# Patient Record
Sex: FEMALE | Race: WHITE | NOT HISPANIC OR LATINO
[De-identification: names, ages, dates, MRNs, and addresses within clinical notes are randomized per-mention and may not be internally consistent; named-entity substitution may affect disease eponyms.]

---

## 2017-10-29 ENCOUNTER — RECORD ABSTRACTING (OUTPATIENT)
Age: 82
End: 2017-10-29

## 2017-10-29 DIAGNOSIS — Z80.3 FAMILY HISTORY OF MALIGNANT NEOPLASM OF BREAST: ICD-10-CM

## 2017-10-29 DIAGNOSIS — Z80.7 FAMILY HISTORY OF OTHER MALIGNANT NEOPLASMS OF LYMPHOID, HEMATOPOIETIC AND RELATED TISSUES: ICD-10-CM

## 2017-10-29 DIAGNOSIS — Z82.49 FAMILY HISTORY OF ISCHEMIC HEART DISEASE AND OTHER DISEASES OF THE CIRCULATORY SYSTEM: ICD-10-CM

## 2017-10-31 ENCOUNTER — APPOINTMENT (OUTPATIENT)
Dept: FAMILY MEDICINE | Facility: CLINIC | Age: 82
End: 2017-10-31
Payer: MEDICARE

## 2017-10-31 VITALS
HEIGHT: 64 IN | DIASTOLIC BLOOD PRESSURE: 60 MMHG | BODY MASS INDEX: 27.83 KG/M2 | HEART RATE: 82 BPM | SYSTOLIC BLOOD PRESSURE: 155 MMHG | WEIGHT: 163 LBS | OXYGEN SATURATION: 98 %

## 2017-10-31 PROCEDURE — 99214 OFFICE O/P EST MOD 30 MIN: CPT

## 2018-02-19 ENCOUNTER — RX RENEWAL (OUTPATIENT)
Age: 83
End: 2018-02-19

## 2018-02-20 ENCOUNTER — RX RENEWAL (OUTPATIENT)
Age: 83
End: 2018-02-20

## 2018-03-28 ENCOUNTER — MEDICATION RENEWAL (OUTPATIENT)
Age: 83
End: 2018-03-28

## 2018-07-19 ENCOUNTER — APPOINTMENT (OUTPATIENT)
Dept: FAMILY MEDICINE | Facility: CLINIC | Age: 83
End: 2018-07-19

## 2018-09-12 RX ORDER — VALSARTAN 80 MG/1
80 TABLET, COATED ORAL DAILY
Qty: 90 | Refills: 1 | Status: DISCONTINUED | COMMUNITY
End: 2018-09-12

## 2018-09-18 ENCOUNTER — APPOINTMENT (OUTPATIENT)
Dept: FAMILY MEDICINE | Facility: CLINIC | Age: 83
End: 2018-09-18
Payer: MEDICARE

## 2018-09-18 ENCOUNTER — NON-APPOINTMENT (OUTPATIENT)
Age: 83
End: 2018-09-18

## 2018-09-18 VITALS
BODY MASS INDEX: 27.83 KG/M2 | HEIGHT: 64 IN | SYSTOLIC BLOOD PRESSURE: 146 MMHG | DIASTOLIC BLOOD PRESSURE: 84 MMHG | HEART RATE: 96 BPM | WEIGHT: 163 LBS | RESPIRATION RATE: 14 BRPM | OXYGEN SATURATION: 97 %

## 2018-09-18 LAB — GLUCOSE BLDC GLUCOMTR-MCNC: 255

## 2018-09-18 PROCEDURE — 82962 GLUCOSE BLOOD TEST: CPT

## 2018-09-18 PROCEDURE — 93000 ELECTROCARDIOGRAM COMPLETE: CPT

## 2018-09-18 PROCEDURE — 36415 COLL VENOUS BLD VENIPUNCTURE: CPT

## 2018-09-18 PROCEDURE — 99214 OFFICE O/P EST MOD 30 MIN: CPT | Mod: 25

## 2018-09-18 RX ORDER — TRAMADOL HYDROCHLORIDE 50 MG/1
50 TABLET, COATED ORAL
Qty: 60 | Refills: 5 | Status: DISCONTINUED | COMMUNITY
Start: 2017-10-31 | End: 2018-09-18

## 2018-09-18 RX ORDER — OXYCODONE 5 MG/1
5 TABLET ORAL
Qty: 60 | Refills: 0 | Status: DISCONTINUED | COMMUNITY
Start: 2017-10-31 | End: 2018-09-18

## 2018-09-18 RX ORDER — CYCLOBENZAPRINE HYDROCHLORIDE 10 MG/1
10 TABLET, FILM COATED ORAL
Qty: 30 | Refills: 1 | Status: DISCONTINUED | COMMUNITY
Start: 2017-10-31 | End: 2018-09-18

## 2018-09-18 NOTE — ASSESSMENT
[FreeTextEntry1] : ekg nsr pac\par Labs drawn/ specimens obtained  in office on this date of service  for evaluation of   assessed conditions - diabetes, thyroid    to be run at [facility].\par will forward labs to Dr Merritt as per patient request\par Discussed with patient that she is feeling side effects from prednisone and chemotherapy and the elevated blood sugars. No sugar diet. She has had success in the past. Patient does not want medications. Take amlodipine. Try miralax otc for constipation. \par We spent sufficient time to discuss aspects of care; questions were answered  to patient's satisfaction.The diagnosis and care plan were discussed with patient in detail.  Patient test results were  reviewed and explained in full. All questions were answered.\par

## 2018-09-18 NOTE — HEALTH RISK ASSESSMENT
[] : No [No falls in past year] : Patient reported no falls in the past year [de-identified] : hematology Dr Merritt

## 2018-09-18 NOTE — HISTORY OF PRESENT ILLNESS
[FreeTextEntry1] : patient presents for glucose check as requested by oncologist- Dr. Merritt \par also, c/o constipation for x 1 week. 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 she just had chemotherapy\par She is feeling weak, fatigue, constipated. she is concerned with her sugars.\par I was on prednisone 50 mg 1 tab for  5 days for   chemotherapy. ( she has an allergy to corticosteroids)  I started to feel pounding in my heart.  I had a different type of chemo this time and it was not agreeing with me. \par \par  Valsartan was recalled and I did not replace it with anything due to the fact it was a very low dose and she has multiple allergies to medications\par \par She did not take her blood pressure medications today.

## 2018-09-18 NOTE — REVIEW OF SYSTEMS
[Fatigue] : fatigue [Recent Change In Weight] : ~T recent weight change [Constipation] : constipation [Negative] : Respiratory

## 2018-09-18 NOTE — PHYSICAL EXAM
[No Acute Distress] : no acute distress [Well Nourished] : well nourished [Normal Rhythm/Effort] : normal respiratory rhythm and effort [Clear Bilaterally] : the lungs were clear to auscultation bilaterally [Normal to Percussion] : the lungs were normal to percussion [Normal] : palpation of the chest was normal [Tachycardia] : tachycardic [Premature Beats] : regular with premature beats [Normal S1] : normal S1 [Normal S2] : normal S2

## 2018-09-21 ENCOUNTER — MEDICATION RENEWAL (OUTPATIENT)
Age: 83
End: 2018-09-21

## 2018-09-21 LAB
ALBUMIN SERPL ELPH-MCNC: 4.3 G/DL
ALP BLD-CCNC: 59 U/L
ALT SERPL-CCNC: 24 U/L
ANION GAP SERPL CALC-SCNC: 15 MMOL/L
AST SERPL-CCNC: 18 U/L
BILIRUB SERPL-MCNC: 0.8 MG/DL
BUN SERPL-MCNC: 21 MG/DL
CALCIUM SERPL-MCNC: 10.2 MG/DL
CHLORIDE SERPL-SCNC: 96 MMOL/L
CO2 SERPL-SCNC: 27 MMOL/L
CREAT SERPL-MCNC: 0.86 MG/DL
GLUCOSE SERPL-MCNC: 247 MG/DL
HBA1C MFR BLD HPLC: 8.6 %
POTASSIUM SERPL-SCNC: 3.8 MMOL/L
PROT SERPL-MCNC: 6.9 G/DL
SODIUM SERPL-SCNC: 138 MMOL/L
T3 SERPL-MCNC: 126 NG/DL
T4 FREE SERPL-MCNC: 1.4 NG/DL
TSH SERPL-ACNC: 2.76 UIU/ML

## 2018-10-30 ENCOUNTER — RX RENEWAL (OUTPATIENT)
Age: 83
End: 2018-10-30

## 2019-01-23 ENCOUNTER — MEDICATION RENEWAL (OUTPATIENT)
Age: 84
End: 2019-01-23

## 2019-02-19 ENCOUNTER — MEDICATION RENEWAL (OUTPATIENT)
Age: 84
End: 2019-02-19

## 2019-02-27 ENCOUNTER — NON-APPOINTMENT (OUTPATIENT)
Age: 84
End: 2019-02-27

## 2019-02-27 ENCOUNTER — APPOINTMENT (OUTPATIENT)
Dept: FAMILY MEDICINE | Facility: CLINIC | Age: 84
End: 2019-02-27
Payer: MEDICARE

## 2019-02-27 VITALS
HEART RATE: 84 BPM | OXYGEN SATURATION: 98 % | BODY MASS INDEX: 26.63 KG/M2 | WEIGHT: 156 LBS | HEIGHT: 64 IN | SYSTOLIC BLOOD PRESSURE: 144 MMHG | RESPIRATION RATE: 14 BRPM | DIASTOLIC BLOOD PRESSURE: 84 MMHG

## 2019-02-27 DIAGNOSIS — Z87.898 PERSONAL HISTORY OF OTHER SPECIFIED CONDITIONS: ICD-10-CM

## 2019-02-27 PROCEDURE — 93000 ELECTROCARDIOGRAM COMPLETE: CPT

## 2019-02-27 PROCEDURE — G0439: CPT

## 2019-02-27 RX ORDER — SITAGLIPTIN 100 MG/1
100 TABLET, FILM COATED ORAL
Qty: 90 | Refills: 3 | Status: DISCONTINUED | COMMUNITY
Start: 2018-09-21 | End: 2019-02-27

## 2019-02-27 NOTE — PHYSICAL EXAM
[No Acute Distress] : no acute distress [Well Nourished] : well nourished [Well Developed] : well developed [EOMI] : extraocular movements intact [Normal Outer Ear/Nose] : the outer ears and nose were normal in appearance [Normal Oropharynx] : the oropharynx was normal [Normal TMs] : both tympanic membranes were normal [No Lymphadenopathy] : no lymphadenopathy [No Respiratory Distress] : no respiratory distress  [Clear to Auscultation] : lungs were clear to auscultation bilaterally [Normal Rate] : normal rate  [Regular Rhythm] : with a regular rhythm [de-identified] : right hand swollen red due to overuse of veins. phlebitis on forearm noted

## 2019-02-27 NOTE — ASSESSMENT
[FreeTextEntry1] : Basic cardiovascular prevention measures are advised including regular exercise, surveillance medical examination, and prudent portion-controlled low fat diet, rich in a variety of vegetables with minimal added sugars, refined starches, and no artificially hydrogenated oils.\par ekg\par follow up with pet scan report\par follow up with labs from hematology\par follow up with Dr Merritt. I defer all questions pertaining to cancer to him. I defer reviewing results not ordered by me\par

## 2019-02-27 NOTE — HEALTH RISK ASSESSMENT
[No falls in past year] : Patient reported no falls in the past year [Patient declined PAP Smear] : Patient declined PAP Smear [Patient declined colonoscopy] : Patient declined colonoscopy [HIV test declined] : HIV test declined [Hepatitis C test declined] : Hepatitis C test declined [With Family] : lives with family [# of Members in Household ___] :  household currently consist of [unfilled] member(s) [Retired] : retired [High School] : high school [] :  [# Of Children ___] : has [unfilled] children [Feels Safe at Home] : Feels safe at home [Fully functional (bathing, dressing, toileting, transferring, walking, feeding)] : Fully functional (bathing, dressing, toileting, transferring, walking, feeding) [Fully functional (using the telephone, shopping, preparing meals, housekeeping, doing laundry, using] : Fully functional and needs no help or supervision to perform IADLs (using the telephone, shopping, preparing meals, housekeeping, doing laundry, using transportation, managing medications and managing finances) [Reports changes in hearing] : Reports changes in hearing [Reports changes in vision] : Reports changes in vision [Reports normal functional visual acuity (ie: able to read med bottle)] : Reports normal functional visual acuity [Reports changes in dental health] : Reports changes in dental health [Safety elements used in home] : safety elements used in home [Seat Belt] :  uses seat belt [With Patient/Caregiver] : With Patient/Caregiver [Designated Healthcare Proxy] : Designated healthcare proxy [Relationship: ___] : Relationship: [unfilled] [Good] : ~his/her~ current health as good [Very Good] : ~his/her~  mood as very good [Intercurrent hospitalizations] : was admitted to the hospital  [Patient declined mammogram] : Patient declined mammogram [Patient declined bone density test] : Patient declined bone density test [Access to Medications] : access to medications [Financial] : financial [Name: ___] : Health Care Proxy's Name: [unfilled]  [DNR] : DNR [FreeTextEntry1] : medicare wellness [] : No [de-identified] : biopsy on back [de-identified] : oncology [de-identified] : no [de-identified] : low sugar [Change in mental status noted] : No change in mental status noted [Language] : denies difficulty with language [Behavior] : denies difficulty with behavior [Learning/Retaining New Information] : denies difficulty learning/retaining new information [Handling Complex Tasks] : denies difficulty handling complex tasks [Reasoning] : denies difficulty with reasoning [Spatial Ability and Orientation] : denies difficulty with spatial ability and orientation [Sexually Active] : not sexually active [Smoke Detector] : no smoke detector [Carbon Monoxide Detector] : no carbon monoxide detector [Guns at Home] : no guns at home [Sunscreen] : does not use sunscreen [Travel to Developing Areas] : does not  travel to developing areas [TB Exposure] : is not being exposed to tuberculosis [Caregiver Concerns] : does not have caregiver concerns [de-identified] : glasses [AdvancecareDate] : 2/28/19 [FreeTextEntry4] : she is getting papers drawn up at [facility]

## 2019-02-27 NOTE — REVIEW OF SYSTEMS
[Dizziness] : dizziness [Negative] : Gastrointestinal [FreeTextEntry7] : frequent bowels.  after she eats [FreeTextEntry9] : back pain [de-identified] : hair growing in

## 2019-07-08 ENCOUNTER — APPOINTMENT (OUTPATIENT)
Dept: FAMILY MEDICINE | Facility: CLINIC | Age: 84
End: 2019-07-08
Payer: MEDICARE

## 2019-07-08 VITALS
OXYGEN SATURATION: 97 % | BODY MASS INDEX: 26.63 KG/M2 | SYSTOLIC BLOOD PRESSURE: 140 MMHG | RESPIRATION RATE: 14 BRPM | HEIGHT: 64 IN | DIASTOLIC BLOOD PRESSURE: 80 MMHG | HEART RATE: 94 BPM | WEIGHT: 156 LBS

## 2019-07-08 LAB — GLUCOSE BLDC GLUCOMTR-MCNC: 274

## 2019-07-08 PROCEDURE — 82962 GLUCOSE BLOOD TEST: CPT

## 2019-07-08 PROCEDURE — 99214 OFFICE O/P EST MOD 30 MIN: CPT | Mod: 25

## 2019-07-08 RX ORDER — BLOOD-GLUCOSE METER
KIT MISCELLANEOUS
Qty: 1 | Refills: 0 | Status: ACTIVE | OUTPATIENT
Start: 2019-07-08

## 2019-07-16 NOTE — HEALTH RISK ASSESSMENT
[No] : In the past 12 months have you used drugs other than those required for medical reasons? No [] : No [de-identified] : [facility] [de-identified] : no [de-identified] : no

## 2019-07-16 NOTE — PHYSICAL EXAM
[PERRL] : pupils equal round and reactive to light [Normal Sclera/Conjunctiva] : normal sclera/conjunctiva [Normal Oropharynx] : the oropharynx was normal [Normal] : the outer ears and nose were normal in appearance and the oropharynx was normal [No Accessory Muscle Use] : no accessory muscle use [Regular Rhythm] : with a regular rhythm [Normal Rate] : normal rate  [Normal S1, S2] : normal S1 and S2 [de-identified] : blood sugar  274

## 2019-07-16 NOTE — HISTORY OF PRESENT ILLNESS
[FreeTextEntry1] : pt presents for follow up  [de-identified] : 88 yo wf here for follow up on niddm. She is under [facility] treatment for Hodgkins Lymphoma   I am not compliant with my diet. I am extremely katiana to be here I don’t have cancer. since our last visit, I get tired.  I am not active I did not listen when you told me I have diabetes.This last diabetic test I did not fast . The reading at [facility] said it was over 400.\par The last time my medicine was very expensive. I need to have medicine I can afford and something I am not allergic to and I hope that I don’t get shots.\par When I have chemo I get numb finger tips and now it is going to my hands. I am dropping things and I have no strength.\par I have pain in my knuckles I cant grasp things \par I have blurred vision.\par I drink a lot of water  2 giant things all day. I am off my diet. i drink chocolate  milk and chocolate. \par I am urinating a lot.

## 2019-07-16 NOTE — ASSESSMENT
[FreeTextEntry1] :  Patient has a diagnosis of niddm. Her prognosis is fair. Her condition is guarded. She will start treatment with actos.  She has had prior treatment with januvia and is allergic to metformin. \par poct blood sugar 274\par check blood sugar at home qa hs I have given her renewals of blood sugar lancets ( quantity 100) and lancet (quantity 100). Her physical limitations due to the condition are that she is   has been fatigued . Also her vision is worse.  \par start actos  30 mg qd dinner. No concentrated sweets, milk chocolate mild. Increase activity.\par Medications and diet reviewed. Continue proactive approach to health.  Annual Ophthalmology exam. Follow up Podiatry. Follow up with blood sugar log   in two weeks. Patient has very poor veins and is a very hard stick. we will utilize her blood sugar log Her last hgba1c was 9.7 on May 29, 2019 Hopefully her neuropathy, fatigue polydipsia and polyphagia will improve.\par \par  \par \par \par 
none

## 2019-07-16 NOTE — REVIEW OF SYSTEMS
[Fatigue] : fatigue [Recent Change In Weight] : ~T recent weight change [Negative] : Gastrointestinal [FreeTextEntry2] : my pants are looser

## 2019-07-22 ENCOUNTER — APPOINTMENT (OUTPATIENT)
Dept: FAMILY MEDICINE | Facility: CLINIC | Age: 84
End: 2019-07-22

## 2019-07-29 ENCOUNTER — MEDICATION RENEWAL (OUTPATIENT)
Age: 84
End: 2019-07-29

## 2019-08-01 ENCOUNTER — APPOINTMENT (OUTPATIENT)
Dept: FAMILY MEDICINE | Facility: CLINIC | Age: 84
End: 2019-08-01

## 2019-08-07 ENCOUNTER — APPOINTMENT (OUTPATIENT)
Dept: FAMILY MEDICINE | Facility: CLINIC | Age: 84
End: 2019-08-07
Payer: MEDICARE

## 2019-08-07 VITALS — BODY MASS INDEX: 26.63 KG/M2 | HEIGHT: 64 IN | WEIGHT: 156 LBS

## 2019-08-07 LAB — GLUCOSE BLDC GLUCOMTR-MCNC: 173

## 2019-08-07 PROCEDURE — 99213 OFFICE O/P EST LOW 20 MIN: CPT

## 2019-08-07 PROCEDURE — 82962 GLUCOSE BLOOD TEST: CPT

## 2019-08-07 NOTE — REVIEW OF SYSTEMS
[Fatigue] : fatigue [Recent Change In Weight] : ~T recent weight change [Negative] : Genitourinary [de-identified] : shingles pain

## 2019-08-07 NOTE — PHYSICAL EXAM
[Ill-Appearing] : ill-appearing [Normal] : normal rate, regular rhythm, normal S1 and S2 and no murmur heard [de-identified] : left chest wall scabbed vessicular clusters.

## 2019-08-07 NOTE — ASSESSMENT
[FreeTextEntry1] : cont gabapentin 300mg 1 bid  and third for severe pain. \par trial cream lidocaine for topical  bid\par discussed disease process and prognosis. It is possible she will be on gabapentin for at least 1 month or more. \par she started valtrex over 2 weeks into the rash. She is responding very  well to gabapentin. \par she needs instruction on blood sugar monitor\par she is concerned with her blood sugar medicine that could cause cancer but since she is allergic to metformin and her diabetes is bad and she cannot afford other medications and she only want oral meds this is our only choice.

## 2019-08-07 NOTE — HEALTH RISK ASSESSMENT
[Intercurrent Urgi Care visits] : went to urgent care [No] : In the past 12 months have you used drugs other than those required for medical reasons? No [No falls in past year] : Patient reported no falls in the past year [] : No [de-identified] :  hematology [Audit-CScore] : 0 [de-identified] : no [de-identified] : diabetic

## 2019-08-07 NOTE — HISTORY OF PRESENT ILLNESS
[FreeTextEntry1] : pt presents for follow up due to shingles and medication renewal  [de-identified] : 88 yo wf here for follow up on shingles. she was diagnosed by an urgent care after two weeks into the rash . She has finished the valtrex. the pain is unbearable. she called yesterday and requested pain meds /renewal of valtrex again . I explained to her the disease prognosis and treatment and that we would prescribe gabapentin for the post herpetic neuralgia. \par \par Pt took the gabapentin. It helped I was able to sleep. SHe is able to touch the skin. It was so severe it felt like her skin was burned.\par \par patient is not sure she knows how to check her blood sugar and would like it done here.

## 2019-08-27 ENCOUNTER — MEDICATION RENEWAL (OUTPATIENT)
Age: 84
End: 2019-08-27

## 2019-09-18 ENCOUNTER — RX RENEWAL (OUTPATIENT)
Age: 84
End: 2019-09-18

## 2019-12-10 ENCOUNTER — APPOINTMENT (OUTPATIENT)
Dept: FAMILY MEDICINE | Facility: CLINIC | Age: 84
End: 2019-12-10
Payer: MEDICARE

## 2019-12-10 VITALS — HEIGHT: 64 IN | WEIGHT: 156 LBS | BODY MASS INDEX: 26.63 KG/M2

## 2019-12-10 PROCEDURE — 99213 OFFICE O/P EST LOW 20 MIN: CPT

## 2019-12-10 RX ORDER — PIOGLITAZONE HYDROCHLORIDE 30 MG/1
30 TABLET ORAL DAILY
Qty: 30 | Refills: 6 | Status: DISCONTINUED | COMMUNITY
Start: 2019-07-08 | End: 2019-12-10

## 2019-12-10 RX ORDER — GABAPENTIN 300 MG/1
300 CAPSULE ORAL 3 TIMES DAILY
Qty: 90 | Refills: 0 | Status: COMPLETED | COMMUNITY
Start: 2019-08-06 | End: 2019-12-10

## 2019-12-10 RX ORDER — GABAPENTIN 100 MG/1
100 CAPSULE ORAL EVERY 8 HOURS
Qty: 60 | Refills: 0 | Status: COMPLETED | COMMUNITY
Start: 2019-08-27 | End: 2019-12-10

## 2019-12-10 NOTE — ASSESSMENT
[FreeTextEntry1] : retake the valtrex prophylactically.1 g tid 7 days. follow up hematology for blood work she has poor veins\par We spent sufficient time to discuss aspects of care; questions were answered  to patient's satisfaction.The diagnosis and care plan were discussed with patient in detail.  Patient test results were  reviewed and explained in full. All questions and concerns  were answered to the best of my knowledge.\par

## 2019-12-10 NOTE — PHYSICAL EXAM
[Normal] : normal rate, regular rhythm, normal S1 and S2 and no murmur heard [de-identified] : left chest and left abdomen scars from shingles no active lesion or new lesions

## 2019-12-10 NOTE — HISTORY OF PRESENT ILLNESS
[___ Days ago] : [unfilled] days ago [FreeTextEntry8] : 88 yo wf here for follow up \par she got up and went to bend down and felt a pinch and there was a reddish pinkish are where she had the shingles before. It just started today. Yesterday she had two lines on the right side but it went away.\par \par also I never took the pioglitazone bc it said you could get bladder cancer\par I will have blood work with  the hematology\par

## 2019-12-10 NOTE — HEALTH RISK ASSESSMENT
[No] : In the past 12 months have you used drugs other than those required for medical reasons? No [No falls in past year] : Patient reported no falls in the past year [] : No [de-identified] : no [Audit-CScore] : 0 [de-identified] : no

## 2020-01-31 LAB — HBA1C MFR BLD HPLC: 8.6

## 2020-02-01 ENCOUNTER — NON-APPOINTMENT (OUTPATIENT)
Age: 85
End: 2020-02-01

## 2020-02-12 ENCOUNTER — RESULT REVIEW (OUTPATIENT)
Age: 85
End: 2020-02-12

## 2020-03-10 ENCOUNTER — OUTPATIENT (OUTPATIENT)
Dept: OUTPATIENT SERVICES | Facility: HOSPITAL | Age: 85
LOS: 1 days | Discharge: HOME | End: 2020-03-10

## 2020-03-10 DIAGNOSIS — C85.99 NON-HODGKIN LYMPHOMA, UNSPECIFIED, EXTRANODAL AND SOLID ORGAN SITES: ICD-10-CM

## 2020-03-23 ENCOUNTER — RX RENEWAL (OUTPATIENT)
Age: 85
End: 2020-03-23

## 2020-04-08 ENCOUNTER — APPOINTMENT (OUTPATIENT)
Dept: HEMATOLOGY ONCOLOGY | Facility: CLINIC | Age: 85
End: 2020-04-08

## 2020-06-30 ENCOUNTER — APPOINTMENT (OUTPATIENT)
Dept: FAMILY MEDICINE | Facility: CLINIC | Age: 85
End: 2020-06-30
Payer: MEDICARE

## 2020-06-30 VITALS
OXYGEN SATURATION: 98 % | DIASTOLIC BLOOD PRESSURE: 70 MMHG | SYSTOLIC BLOOD PRESSURE: 120 MMHG | WEIGHT: 153 LBS | BODY MASS INDEX: 26.12 KG/M2 | HEART RATE: 89 BPM | RESPIRATION RATE: 14 BRPM | TEMPERATURE: 98.2 F | HEIGHT: 64 IN

## 2020-06-30 PROCEDURE — 36415 COLL VENOUS BLD VENIPUNCTURE: CPT

## 2020-06-30 PROCEDURE — 99214 OFFICE O/P EST MOD 30 MIN: CPT | Mod: 25

## 2020-06-30 NOTE — HISTORY OF PRESENT ILLNESS
[FreeTextEntry1] : Patient present for medication renewal\par  [de-identified] : Patient reports today for diabetes follow up. She was seeing endocrinologist but she was put on metformin which she was allergic to. She gets palpitations and dizziness despite taking with Benadryl. She is also unable to take sulfonylureas due to sulfa allergy. She reports she is unable to afford her medications, states Tradjenta worked well for her in the past but she was unable to afford it. Patient otherwise feeling well, no acute complaints. She is waiting to follow up with a new endocrinologist.

## 2020-06-30 NOTE — ASSESSMENT
[FreeTextEntry1] : Check labs drawn in office today for above assessed conditions. Labs to be resulted at the [facility] core lab.\par Trial Onglyza due to sulfa and metformin allergies \par Vitals and exam stable \par RTO in 3 mos

## 2020-07-01 RX ORDER — SAXAGLIPTIN 2.5 MG/1
2.5 TABLET, FILM COATED ORAL DAILY
Qty: 30 | Refills: 2 | Status: DISCONTINUED | COMMUNITY
Start: 2020-06-30 | End: 2020-07-01

## 2020-07-02 LAB
ALBUMIN SERPL ELPH-MCNC: 5 G/DL
ALP BLD-CCNC: 72 U/L
ALT SERPL-CCNC: 23 U/L
ANION GAP SERPL CALC-SCNC: 25 MMOL/L
AST SERPL-CCNC: 53 U/L
BILIRUB SERPL-MCNC: 0.4 MG/DL
BUN SERPL-MCNC: 20 MG/DL
CALCIUM SERPL-MCNC: 10.4 MG/DL
CHLORIDE SERPL-SCNC: 104 MMOL/L
CHOLEST SERPL-MCNC: 194 MG/DL
CHOLEST/HDLC SERPL: 5 RATIO
CO2 SERPL-SCNC: 12 MMOL/L
CREAT SERPL-MCNC: 0.75 MG/DL
GLUCOSE SERPL-MCNC: 154 MG/DL
HDLC SERPL-MCNC: 39 MG/DL
LDLC SERPL CALC-MCNC: 120 MG/DL
POTASSIUM SERPL-SCNC: 4.9 MMOL/L
PROT SERPL-MCNC: 7.3 G/DL
SODIUM SERPL-SCNC: 141 MMOL/L
TRIGL SERPL-MCNC: 177 MG/DL

## 2020-07-05 LAB
ESTIMATED AVERAGE GLUCOSE: 180 MG/DL
HBA1C MFR BLD HPLC: 7.9 %

## 2020-07-23 RX ORDER — SAXAGLIPTIN 5 MG/1
5 TABLET, FILM COATED ORAL
Qty: 30 | Refills: 3 | Status: COMPLETED | COMMUNITY
Start: 2020-07-22 | End: 2020-07-23

## 2020-08-07 LAB — HBA1C MFR BLD HPLC: 7.8

## 2020-09-29 ENCOUNTER — APPOINTMENT (OUTPATIENT)
Dept: FAMILY MEDICINE | Facility: CLINIC | Age: 85
End: 2020-09-29
Payer: MEDICARE

## 2020-09-29 VITALS
RESPIRATION RATE: 14 BRPM | SYSTOLIC BLOOD PRESSURE: 120 MMHG | BODY MASS INDEX: 25.61 KG/M2 | TEMPERATURE: 97.3 F | DIASTOLIC BLOOD PRESSURE: 60 MMHG | WEIGHT: 150 LBS | HEIGHT: 64 IN | HEART RATE: 98 BPM | OXYGEN SATURATION: 97 %

## 2020-09-29 PROCEDURE — 99214 OFFICE O/P EST MOD 30 MIN: CPT

## 2020-09-29 RX ORDER — VALACYCLOVIR 1 G/1
1 TABLET, FILM COATED ORAL 3 TIMES DAILY
Qty: 21 | Refills: 0 | Status: COMPLETED | COMMUNITY
Start: 2019-07-29 | End: 2020-09-29

## 2020-09-29 RX ORDER — TRIAMCINOLONE ACETONIDE 1 MG/G
0.1 CREAM TOPICAL TWICE DAILY
Qty: 1 | Refills: 0 | Status: ACTIVE | COMMUNITY
Start: 2020-09-29 | End: 1900-01-01

## 2020-09-29 NOTE — HISTORY OF PRESENT ILLNESS
[FreeTextEntry1] : Patient present for follow up \par  [de-identified] : Patient reports today for diabetes follow up. Patient states she did not start Tradjenta, despite us getting the discount for her. She reports exercising daily and watching her diet but she does have occasional sweets. Pt also reports right forearm redness and swelling, she thinks she had an allergic reaction to some cookies that her some got delievered. Her arm was itching. She did cold and warm compresses and swelling has gone down some. Denies chest pain, sob, palpitations, shakiness, dizziness.

## 2020-09-29 NOTE — ASSESSMENT
[FreeTextEntry1] : Check labs - rx slip given \par Vitals and exam stable \par Pt agreeable to start tradjenta if A1C is still over 7. Explained the importance of compliance and risks of adverse events associated with uncontrolled sugars. \par Flu and pneumonia shots refused. \par RTO in 3 mos \par \par Start triamcinolone for forearm rash. Warm compresses to help alleviate swelling. Pt will notify me if swelling worsens or persists.

## 2020-09-29 NOTE — PHYSICAL EXAM
[Normal Sclera/Conjunctiva] : normal sclera/conjunctiva [Normal Outer Ear/Nose] : the outer ears and nose were normal in appearance [Normal] : normal gait, coordination grossly intact, no focal deficits and deep tendon reflexes were 2+ and symmetric [de-identified] : R forearm: circular redness and mild swelling noted, mildly warm to touch.

## 2020-12-01 ENCOUNTER — RX RENEWAL (OUTPATIENT)
Age: 85
End: 2020-12-01

## 2020-12-29 ENCOUNTER — APPOINTMENT (OUTPATIENT)
Dept: FAMILY MEDICINE | Facility: CLINIC | Age: 85
End: 2020-12-29

## 2021-02-03 ENCOUNTER — RX RENEWAL (OUTPATIENT)
Age: 86
End: 2021-02-03

## 2021-03-22 ENCOUNTER — TRANSCRIPTION ENCOUNTER (OUTPATIENT)
Age: 86
End: 2021-03-22

## 2021-04-07 ENCOUNTER — TRANSCRIPTION ENCOUNTER (OUTPATIENT)
Age: 86
End: 2021-04-07

## 2021-05-11 ENCOUNTER — APPOINTMENT (OUTPATIENT)
Dept: FAMILY MEDICINE | Facility: CLINIC | Age: 86
End: 2021-05-11
Payer: MEDICARE

## 2021-05-11 VITALS
WEIGHT: 154 LBS | SYSTOLIC BLOOD PRESSURE: 118 MMHG | HEIGHT: 64 IN | HEART RATE: 82 BPM | RESPIRATION RATE: 14 BRPM | DIASTOLIC BLOOD PRESSURE: 76 MMHG | TEMPERATURE: 98.1 F | OXYGEN SATURATION: 98 % | BODY MASS INDEX: 26.29 KG/M2

## 2021-05-11 DIAGNOSIS — M43.16 SPONDYLOLISTHESIS, LUMBAR REGION: ICD-10-CM

## 2021-05-11 DIAGNOSIS — Z87.898 PERSONAL HISTORY OF OTHER SPECIFIED CONDITIONS: ICD-10-CM

## 2021-05-11 DIAGNOSIS — L23.9 ALLERGIC CONTACT DERMATITIS, UNSPECIFIED CAUSE: ICD-10-CM

## 2021-05-11 DIAGNOSIS — Z87.19 PERSONAL HISTORY OF OTHER DISEASES OF THE DIGESTIVE SYSTEM: ICD-10-CM

## 2021-05-11 DIAGNOSIS — Z86.39 PERSONAL HISTORY OF OTHER ENDOCRINE, NUTRITIONAL AND METABOLIC DISEASE: ICD-10-CM

## 2021-05-11 DIAGNOSIS — D70.2 OTHER DRUG-INDUCED AGRANULOCYTOSIS: ICD-10-CM

## 2021-05-11 DIAGNOSIS — Z86.19 PERSONAL HISTORY OF OTHER INFECTIOUS AND PARASITIC DISEASES: ICD-10-CM

## 2021-05-11 LAB — HBA1C MFR BLD HPLC: 7.3

## 2021-05-11 PROCEDURE — 99213 OFFICE O/P EST LOW 20 MIN: CPT | Mod: 25

## 2021-05-11 PROCEDURE — 83036 HEMOGLOBIN GLYCOSYLATED A1C: CPT | Mod: QW

## 2021-05-11 RX ORDER — LANCETS 28 GAUGE
EACH MISCELLANEOUS
Qty: 4 | Refills: 2 | Status: ACTIVE | COMMUNITY
Start: 2019-07-08 | End: 1900-01-01

## 2021-05-11 NOTE — PHYSICAL EXAM
[No Acute Distress] : no acute distress [Well Nourished] : well nourished [Well Developed] : well developed [Well-Appearing] : well-appearing [Normal Sclera/Conjunctiva] : normal sclera/conjunctiva [Normal Outer Ear/Nose] : the outer ears and nose were normal in appearance [Normal Oropharynx] : the oropharynx was normal [No JVD] : no jugular venous distention [No Lymphadenopathy] : no lymphadenopathy [Supple] : supple [Thyroid Normal, No Nodules] : the thyroid was normal and there were no nodules present [No Respiratory Distress] : no respiratory distress  [No Accessory Muscle Use] : no accessory muscle use [Clear to Auscultation] : lungs were clear to auscultation bilaterally [Regular Rhythm] : with a regular rhythm [Normal S1, S2] : normal S1 and S2 [No Joint Swelling] : no joint swelling [Normal Gait] : normal gait [Normal Affect] : the affect was normal [Alert and Oriented x3] : oriented to person, place, and time [Normal Insight/Judgement] : insight and judgment were intact [de-identified] : Chemo-Port RT upper chest wall, no calf tenderness  [de-identified] : 1/6 systolic murmur  [de-identified] : RUE mild erythema

## 2021-05-11 NOTE — PLAN
[FreeTextEntry1] : 89 yr old female presented for follow up. \par \par Continue current medications as directed for chronic conditions \par I have reviewed prior consult notes and recent blood work \par \par WBC 2.2 \par hgb/hct: 10.9/32.3\par \par Today an A1c was checked: 7.3 \par advised patient to continue DM diet and avoidance of cakes, carbohydrates \par will continue current medication for DM and repeat in 3 months \par \par c/o chest discomfort has since resolved. \par I have advised patient if chest discomfort occurs and worsened SOB then seek immediate attention \par \par RTO as routine for follow up.

## 2021-05-11 NOTE — HISTORY OF PRESENT ILLNESS
[FreeTextEntry1] : pt presents for follow up  [de-identified] : 89 yr old female history of NHL undergoing current chemotherapy, breast cancer in remission, DM type II, HLD and HTN who presented to the office for follow up. She was seen at Dr. Merritt office for chemo infusion and compliant of chest discomfort. In office vitals and Doppler of lower extremity was negative for DVT.\par \par Today she states denies chest discomfort. She is holding her Revlimid due to low WBC at this time. She is planned to have PET in June 2021. She admits to eating chocolate however she is taking prescribed DM medications. Her fasting blood sugar this morning was 256. Denies changes in vision, dizziness, chest pain, palpitations and lower extremity edema.\par

## 2021-08-11 ENCOUNTER — APPOINTMENT (OUTPATIENT)
Dept: FAMILY MEDICINE | Facility: CLINIC | Age: 86
End: 2021-08-11
Payer: MEDICARE

## 2021-08-11 ENCOUNTER — APPOINTMENT (OUTPATIENT)
Dept: FAMILY MEDICINE | Facility: CLINIC | Age: 86
End: 2021-08-11

## 2021-08-11 ENCOUNTER — NON-APPOINTMENT (OUTPATIENT)
Age: 86
End: 2021-08-11

## 2021-08-11 VITALS
TEMPERATURE: 98.2 F | OXYGEN SATURATION: 98 % | DIASTOLIC BLOOD PRESSURE: 90 MMHG | HEART RATE: 62 BPM | SYSTOLIC BLOOD PRESSURE: 140 MMHG | HEIGHT: 64 IN | RESPIRATION RATE: 16 BRPM | BODY MASS INDEX: 26.29 KG/M2 | WEIGHT: 154 LBS

## 2021-08-11 DIAGNOSIS — Z00.00 ENCOUNTER FOR GENERAL ADULT MEDICAL EXAMINATION W/OUT ABNORMAL FINDINGS: ICD-10-CM

## 2021-08-11 PROCEDURE — 93000 ELECTROCARDIOGRAM COMPLETE: CPT | Mod: 59

## 2021-08-11 PROCEDURE — G0439: CPT

## 2021-08-11 NOTE — PHYSICAL EXAM
[Normal] : soft, non-tender, non-distended, no masses palpated, no HSM and normal bowel sounds [de-identified] : pale [de-identified] : port  [de-identified] : rash on top of shoulder back

## 2021-08-11 NOTE — ASSESSMENT
[FreeTextEntry1] : Basic cardiovascular prevention measures are advised including regular exercise, surveillance medical examination, and prudent portion-controlled low fat diet, rich in a variety of vegetables with minimal added sugars, refined starches, and no artificially hydrogenated oils.\par Discussion and interpretation of previous tests , external notes( labs, radiology, specialist  , patient verbalized understanding.\par Prescription drug management cont all meds\par EKG performed on this day in the office and reviewed by PAYAM Rosa. It is  bradycardia\par \par Labs to be drawn/ specimens obtained  at outside  lab    for evaluation of   assessed conditions - cbc cmp lipid    hgba1c for physical and screening purposes.\par she has a port and gets labs drawn at cancer \[place] refused mammo\par cannot have covid vaccine or other vaccines due to allergy and immunosuppression\par

## 2021-08-11 NOTE — REVIEW OF SYSTEMS
[Fatigue] : fatigue [Recent Change In Weight] : ~T recent weight change [Negative] : Heme/Lymph [Joint Pain] : joint pain [Itching] : Itching [Skin Rash] : skin rash [FreeTextEntry9] : bone pain shoulder and back

## 2021-08-11 NOTE — HEALTH RISK ASSESSMENT
[No] : In the past 12 months have you used drugs other than those required for medical reasons? No [No falls in past year] : Patient reported no falls in the past year [0] : 2) Feeling down, depressed, or hopeless: Not at all (0) [# of Members in Household ___] :  household currently consist of [unfilled] member(s) [High School] : high school [] :  [# Of Children ___] : has [unfilled] children [Feels Safe at Home] : Feels safe at home [Fully functional (bathing, dressing, toileting, transferring, walking, feeding)] : Fully functional (bathing, dressing, toileting, transferring, walking, feeding) [Fully functional (using the telephone, shopping, preparing meals, housekeeping, doing laundry, using] : Fully functional and needs no help or supervision to perform IADLs (using the telephone, shopping, preparing meals, housekeeping, doing laundry, using transportation, managing medications and managing finances) [Reports changes in hearing] : Reports changes in hearing [Reports normal functional visual acuity (ie: able to read med bottle)] : Reports normal functional visual acuity [Travel to Developing Areas] : travel to developing areas [Good] : ~his/her~ current health as good [1 or 2 (0 pts)] : 1 or 2 (0 points) [Never (0 pts)] : Never (0 points) [PHQ-2 Negative - No further assessment needed] : PHQ-2 Negative - No further assessment needed [Patient declined mammogram] : Patient declined mammogram [Patient declined PAP Smear] : Patient declined PAP Smear [Patient declined bone density test] : Patient declined bone density test [Patient declined colonoscopy] : Patient declined colonoscopy [HIV test declined] : HIV test declined [Hepatitis C test declined] : Hepatitis C test declined [None] : None [With Family] : lives with family [Retired] : retired [Designated Healthcare Proxy] : Designated healthcare proxy [Name: ___] : Health Care Proxy's Name: [unfilled]  [Relationship: ___] : Relationship: [unfilled] [Aggressive treatment] : aggressive treatment [Fair] :  ~his/her~ mood as fair [FreeTextEntry1] : medicare wellness [] : No [de-identified] : no [de-identified] : oncology [Audit-CScore] : 0 [de-identified] : treadmill  [de-identified] : diabetic [[id number]] : 0 [Change in mental status noted] : No change in mental status noted [Language] : denies difficulty with language [Behavior] : denies difficulty with behavior [Learning/Retaining New Information] : denies difficulty learning/retaining new information [Handling Complex Tasks] : denies difficulty handling complex tasks [Reasoning] : denies difficulty with reasoning [Spatial Ability and Orientation] : denies difficulty with spatial ability and orientation [Sexually Active] : not sexually active [High Risk Behavior] : no high risk behavior [Reports changes in vision] : Reports no changes in vision [Reports changes in dental health] : Reports no changes in dental health [Smoke Detector] : no smoke detector [Carbon Monoxide Detector] : no carbon monoxide detector [Guns at Home] : no guns at home [Safety elements used in home] : no safety elements used in home [Seat Belt] : does not use seat belt [Sunscreen] : does not use sunscreen [Caregiver Concerns] : does not have caregiver concerns [MammogramDate] : 2021 [PapSmearDate] : 2021 [BoneDensityDate] : 2021 [ColonoscopyDate] : 2021 [AdvancecareDate] : 8/11/21

## 2021-08-11 NOTE — HISTORY OF PRESENT ILLNESS
[FreeTextEntry1] : annual wellness exam \par   [de-identified] : 89 yo wf here for medicare wellness.\par I am fighting cancer now I am on Revlimid and Rituxan infusions  I get tired.  I am not getting covid vaccine bc of the infusions contraindications- I am immunosupressed\par I had severe left hip pain. I had PET scan. Dr Merritt said that I am in remission. I was  on steroids during the infusions  and the pain s gone. I have been isolating because of the virus and my treatment. .\par \par I am annoyed at this world and the stupidity of it \par \par I take my blood sugar and it is 137\par I lost weight I follow a diabetic diet\par I have a rash on my upper back. I never used to have any blemishes on my skin

## 2021-08-20 ENCOUNTER — NON-APPOINTMENT (OUTPATIENT)
Age: 86
End: 2021-08-20

## 2021-09-02 ENCOUNTER — NON-APPOINTMENT (OUTPATIENT)
Age: 86
End: 2021-09-02

## 2021-09-02 ENCOUNTER — APPOINTMENT (OUTPATIENT)
Dept: FAMILY MEDICINE | Facility: CLINIC | Age: 86
End: 2021-09-02
Payer: MEDICARE

## 2021-09-02 VITALS
OXYGEN SATURATION: 98 % | RESPIRATION RATE: 12 BRPM | HEART RATE: 61 BPM | DIASTOLIC BLOOD PRESSURE: 70 MMHG | HEIGHT: 64 IN | BODY MASS INDEX: 24.92 KG/M2 | WEIGHT: 146 LBS | SYSTOLIC BLOOD PRESSURE: 130 MMHG

## 2021-09-02 VITALS — TEMPERATURE: 97.2 F

## 2021-09-02 DIAGNOSIS — E11.9 TYPE 2 DIABETES MELLITUS W/OUT COMPLICATIONS: ICD-10-CM

## 2021-09-02 DIAGNOSIS — I49.1 ATRIAL PREMATURE DEPOLARIZATION: ICD-10-CM

## 2021-09-02 PROCEDURE — 99215 OFFICE O/P EST HI 40 MIN: CPT | Mod: 25

## 2021-09-02 PROCEDURE — 93000 ELECTROCARDIOGRAM COMPLETE: CPT

## 2021-09-02 RX ORDER — LENALIDOMIDE 20 MG/1
CAPSULE ORAL
Refills: 0 | Status: DISCONTINUED | COMMUNITY
End: 2021-09-02

## 2021-09-02 RX ORDER — LINAGLIPTIN 5 MG/1
5 TABLET, FILM COATED ORAL
Qty: 30 | Refills: 6 | Status: DISCONTINUED | COMMUNITY
Start: 2020-07-01 | End: 2021-09-02

## 2021-09-02 RX ORDER — INDAPAMIDE 2.5 MG/1
2.5 TABLET, FILM COATED ORAL DAILY
Qty: 90 | Refills: 0 | Status: DISCONTINUED | COMMUNITY
Start: 2020-07-27 | End: 2021-09-02

## 2021-09-02 RX ORDER — RITUXIMAB 10 MG/ML
10 INJECTION, SOLUTION INTRAVENOUS
Refills: 0 | Status: DISCONTINUED | COMMUNITY
End: 2021-09-02

## 2021-09-02 NOTE — HISTORY OF PRESENT ILLNESS
[FreeTextEntry1] : pt presents for med follow up  [de-identified] : 89 yr old female is here today with son for HFU. She was admitted to [facility] on 8/14/8/19 due to AMS. Found to be dehydrated secondary to oral chemotherapy medication. MRI head was significant for abnormal region in vertebral artery. She was found to to have elevated BP and started on Labetalol. At discharge her indapamide was held. \par \par Today she admits to fatigue and RT>LT lower extremity swelling with mild pain. She was seen by neurologist yesterday and placed on low dose ASA no indiction for repeat MRI brain per neurology. She was taken off the labetalol as well.

## 2021-09-02 NOTE — PLAN
[FreeTextEntry1] : 89 yr old female HFU \par \par 1. Acute LE edema: \par will send for urgent Doppler b/l LE r/o DVT \par elevate legs and monitor salt intake \par \par In office ECG due to irregular pulse on examination \par ECG NSR with PAC's present \par \par continue all medications as directed \par I have reviewed prior hospital notes and labs along with imaging \par fall precautions\par f/u with oncologist as directed \par given scritp for CBC, CMP, mag and phos (hard stick in office) \par RTO as routine for follow up.\par

## 2021-09-02 NOTE — REVIEW OF SYSTEMS
[Fever] : no fever [Chills] : no chills [Fatigue] : fatigue [Hot Flashes] : no hot flashes [Chest Pain] : no chest pain [Palpitations] : no palpitations [Leg Claudication] : no leg claudication [Lower Ext Edema] : lower extremity edema [Orthopnea] : no orthopnea [Negative] : Heme/Lymph

## 2021-09-02 NOTE — PHYSICAL EXAM
[No Acute Distress] : no acute distress [Normal Sclera/Conjunctiva] : normal sclera/conjunctiva [Normal Outer Ear/Nose] : the outer ears and nose were normal in appearance [No JVD] : no jugular venous distention [No Respiratory Distress] : no respiratory distress  [No Accessory Muscle Use] : no accessory muscle use [Clear to Auscultation] : lungs were clear to auscultation bilaterally [Normal Rate] : normal rate  [Regular Rhythm] : with a regular rhythm [Normal S1, S2] : normal S1 and S2 [No Joint Swelling] : no joint swelling [Normal Gait] : normal gait [Normal Affect] : the affect was normal [Normal Insight/Judgement] : insight and judgment were intact [de-identified] : RT>LT LE +1 nonpitting lower extremity edema with mild erythema surronding RT medial ankle.

## 2021-09-04 ENCOUNTER — APPOINTMENT (OUTPATIENT)
Dept: ULTRASOUND IMAGING | Facility: CLINIC | Age: 86
End: 2021-09-04

## 2021-09-09 ENCOUNTER — NON-APPOINTMENT (OUTPATIENT)
Age: 86
End: 2021-09-09

## 2021-09-12 ENCOUNTER — NON-APPOINTMENT (OUTPATIENT)
Age: 86
End: 2021-09-12

## 2021-09-20 ENCOUNTER — APPOINTMENT (OUTPATIENT)
Dept: FAMILY MEDICINE | Facility: CLINIC | Age: 86
End: 2021-09-20
Payer: MEDICARE

## 2021-09-20 VITALS
WEIGHT: 147 LBS | TEMPERATURE: 97.3 F | HEIGHT: 64 IN | SYSTOLIC BLOOD PRESSURE: 134 MMHG | BODY MASS INDEX: 25.1 KG/M2 | HEART RATE: 62 BPM | RESPIRATION RATE: 14 BRPM | OXYGEN SATURATION: 98 % | DIASTOLIC BLOOD PRESSURE: 70 MMHG

## 2021-09-20 VITALS — WEIGHT: 152 LBS | BODY MASS INDEX: 26.09 KG/M2

## 2021-09-20 PROCEDURE — 99495 TRANSJ CARE MGMT MOD F2F 14D: CPT

## 2021-09-20 RX ORDER — LABETALOL HYDROCHLORIDE 100 MG/1
100 TABLET, FILM COATED ORAL
Qty: 28 | Refills: 0 | Status: COMPLETED | COMMUNITY
Start: 2021-08-19

## 2021-09-20 RX ORDER — LOSARTAN POTASSIUM 50 MG/1
50 TABLET, FILM COATED ORAL
Qty: 60 | Refills: 0 | Status: COMPLETED | COMMUNITY
Start: 2021-09-09

## 2021-09-20 NOTE — REVIEW OF SYSTEMS
[Fatigue] : fatigue [Recent Change In Weight] : ~T recent weight change [Lower Ext Edema] : lower extremity edema [Negative] : Respiratory

## 2021-09-21 ENCOUNTER — NON-APPOINTMENT (OUTPATIENT)
Age: 86
End: 2021-09-21

## 2021-09-21 NOTE — ASSESSMENT
[FreeTextEntry1] : Basic cardiovascular prevention measures are advised including regular exercise, surveillance medical examination, and prudent portion-controlled low fat diet, rich in a variety of vegetables with minimal added sugars, refined starches, and no artificially hydrogenated oils.\par Discussion and interpretation of previous tests , external notes( labs, radiology, specialist  , patient verbalized understanding.\par Prescription drug management\par reviewed meds\par follow up visiting nurse for PT and wound check\par .follow up cardiology and oncology\par suggest compression stockings\par increase getting more ambulation to help leg swelling

## 2021-09-21 NOTE — HISTORY OF PRESENT ILLNESS
[Post-hospitalization from ___ Hospital] : Post-hospitalization from [unfilled] Hospital [Admitted on: ___] : The patient was admitted on [unfilled] [Discharged on ___] : discharged on [unfilled] [Patient Contacted By: ____] : and contacted by [unfilled] [FreeTextEntry2] : Patient presents for Hospital follow up was admitted 9/4 /21 for VT, elevated troponin, seizure, VF, hypokalemia, She  was discharged from [facility] 9/10/21. She had a AICD placed. She is seeing cardiology and oncology. They are not sure if the dysrhythmia was from the Revlimid. For now they are taking 2 months off. She is having a PET scan soon as per Dr Merritt.  States she is feeling very weak and now ankles are swollen . She is sleeping most of the day. She is unsteady , she does use a walker. Her blood sugar was good 113. She has been showering daily. She is getting the bandage wet daily. \par \par

## 2021-09-21 NOTE — HEALTH RISK ASSESSMENT
[Intercurrent hospitalizations] : was admitted to the hospital  [No] : In the past 12 months have you used drugs other than those required for medical reasons? No [No falls in past year] : Patient reported no falls in the past year [0] : 2) Feeling down, depressed, or hopeless: Not at all (0) [PHQ-2 Negative - No further assessment needed] : PHQ-2 Negative - No further assessment needed [HIV test declined] : HIV test declined [Hepatitis C test declined] : Hepatitis C test declined [Handling Complex Tasks] : difficulty handling complex tasks [Transportation] : transportation [With Family] : lives with family [# of Members in Household ___] :  household currently consist of [unfilled] member(s) [Single] : single [# Of Children ___] : has [unfilled] children [Feels Safe at Home] : Feels safe at home [Fully functional (bathing, dressing, toileting, transferring, walking, feeding)] : Fully functional (bathing, dressing, toileting, transferring, walking, feeding) [Reports changes in hearing] : Reports changes in hearing [Reports changes in vision] : Reports changes in vision [Reports normal functional visual acuity (ie: able to read med bottle)] : Reports normal functional visual acuity [Smoke Detector] : smoke detector [Carbon Monoxide Detector] : carbon monoxide detector [Safety elements used in home] : safety elements used in home [Seat Belt] :  uses seat belt [Sunscreen] : uses sunscreen [Travel to Developing Areas] : travel to developing areas [] : No [de-identified] : vfib [de-identified] : cardiology oncology [de-identified] : no [Audit-CScore] : 0 [de-identified] : diabetic [CJK3Rrbwk] : 0 [Change in mental status noted] : No change in mental status noted [Language] : denies difficulty with language [Behavior] : denies difficulty with behavior [Learning/Retaining New Information] : denies difficulty learning/retaining new information [Reasoning] : denies difficulty with reasoning [Spatial Ability and Orientation] : denies difficulty with spatial ability and orientation [Sexually Active] : not sexually active [Reports changes in dental health] : Reports no changes in dental health [Guns at Home] : no guns at home [TB Exposure] : is not being exposed to tuberculosis [Caregiver Concerns] : does not have caregiver concerns [de-identified] : needs assistance [Designated Healthcare Proxy] : Designated healthcare proxy [With Patient/Caregiver] : , with patient/caregiver [Name: ___] : Health Care Proxy's Name: [unfilled]  [Relationship: ___] : Relationship: [unfilled] [Aggressive treatment] : aggressive treatment [I will adhere to the patient's wishes.] : I will adhere to the patient's wishes. [AdvancecareDate] : 9/21/21 [FreeTextEntry4] : [id number]

## 2021-09-21 NOTE — PHYSICAL EXAM
[Normal] : normal rate, regular rhythm, normal S1 and S2 and no murmur heard [de-identified] : left chest aicd w glue on it.   edema ankles 1 + bilateral

## 2021-09-29 ENCOUNTER — NON-APPOINTMENT (OUTPATIENT)
Age: 86
End: 2021-09-29

## 2021-10-04 ENCOUNTER — NON-APPOINTMENT (OUTPATIENT)
Age: 86
End: 2021-10-04

## 2021-10-12 RX ORDER — AMLODIPINE BESYLATE 10 MG/1
10 TABLET ORAL
Qty: 90 | Refills: 0 | Status: DISCONTINUED | COMMUNITY
Start: 2017-10-31 | End: 2021-10-12

## 2021-11-09 ENCOUNTER — APPOINTMENT (OUTPATIENT)
Dept: FAMILY MEDICINE | Facility: CLINIC | Age: 86
End: 2021-11-09
Payer: MEDICARE

## 2021-11-09 PROCEDURE — 99441: CPT | Mod: 95

## 2021-11-09 RX ORDER — LEVETIRACETAM 500 MG/1
500 TABLET, FILM COATED ORAL
Qty: 60 | Refills: 0 | Status: DISCONTINUED | COMMUNITY
Start: 2021-08-19 | End: 2021-11-09

## 2021-11-09 NOTE — ASSESSMENT
[FreeTextEntry1] : according to son and patient she has erratic bp and pulses and feels dizzy fatigue and overall not well.\par she is concerned regard to the medications and the labs drawn on Nov 1\par advised I will reach out to the office of Dr Brooke  [phone]\par Dr Jaquez [phone]\par spoke to dr Edwardo Garcia and she will call patient son Tony

## 2021-11-09 NOTE — HISTORY OF PRESENT ILLNESS
[Home] : at home, [unfilled] , at the time of the visit. [Medical Office: ([facility])___] : at the medical office located in  [Verbal consent obtained from patient] : the patient, [unfilled] [FreeTextEntry1] : follow up [de-identified] : Patient initiated communication for concern via HIPAA secure platform  (Telemedicine )  for        bp       / current condition       using     phone         .Reviewed quarantine instructions and self isolation to limit spread of disease  as per [place] guidelines.  Patient is an established patient and has verbalized consent to be treated via telemedicine .\par spoke to Tony   [phone]  he is having frustration regarding [facility] and getting answers. \par bp is wildly eratic am  173/70   afternoon 88/44\par her medication is the same as when she was hospitalized. \par she is still fatigued and dizzy.\par she had a virtual appt hillary piper ( heart failure specialist) \par and NP ? dr Yi electrophysiology\par she was recently off keppra\par \par she fell after getting out of hospital  and landed on the side of the pacemaker . they were trying to see if the pacemaker is ok and is in the right position. \par \par ? med review what is necessary? \par erratic bp and pulse readings   ( 150 - 60 -70  40)\par dizzy fatigue \par lab results\par dr piper [phone]    dr yi [phone]

## 2021-11-11 ENCOUNTER — NON-APPOINTMENT (OUTPATIENT)
Age: 86
End: 2021-11-11

## 2021-11-12 ENCOUNTER — NON-APPOINTMENT (OUTPATIENT)
Age: 86
End: 2021-11-12

## 2021-11-13 ENCOUNTER — TRANSCRIPTION ENCOUNTER (OUTPATIENT)
Age: 86
End: 2021-11-13

## 2021-12-08 ENCOUNTER — RX RENEWAL (OUTPATIENT)
Age: 86
End: 2021-12-08

## 2022-01-28 ENCOUNTER — APPOINTMENT (OUTPATIENT)
Dept: FAMILY MEDICINE | Facility: CLINIC | Age: 87
End: 2022-01-28
Payer: MEDICARE

## 2022-01-28 VITALS
TEMPERATURE: 97.7 F | WEIGHT: 152 LBS | RESPIRATION RATE: 14 BRPM | OXYGEN SATURATION: 97 % | SYSTOLIC BLOOD PRESSURE: 140 MMHG | HEIGHT: 64 IN | DIASTOLIC BLOOD PRESSURE: 70 MMHG | HEART RATE: 77 BPM | BODY MASS INDEX: 25.95 KG/M2

## 2022-01-28 VITALS
HEART RATE: 62 BPM | BODY MASS INDEX: 23.9 KG/M2 | HEIGHT: 64 IN | DIASTOLIC BLOOD PRESSURE: 70 MMHG | RESPIRATION RATE: 14 BRPM | SYSTOLIC BLOOD PRESSURE: 140 MMHG | WEIGHT: 140 LBS | OXYGEN SATURATION: 98 %

## 2022-01-28 DIAGNOSIS — R79.9 ABNORMAL FINDING OF BLOOD CHEMISTRY, UNSPECIFIED: ICD-10-CM

## 2022-01-28 DIAGNOSIS — Z09 ENCOUNTER FOR FOLLOW-UP EXAMINATION AFTER COMPLETED TREATMENT FOR CONDITIONS OTHER THAN MALIGNANT NEOPLASM: ICD-10-CM

## 2022-01-28 DIAGNOSIS — Z87.09 PERSONAL HISTORY OF OTHER DISEASES OF THE RESPIRATORY SYSTEM: ICD-10-CM

## 2022-01-28 DIAGNOSIS — R60.0 LOCALIZED EDEMA: ICD-10-CM

## 2022-01-28 PROCEDURE — 99214 OFFICE O/P EST MOD 30 MIN: CPT

## 2022-01-28 RX ORDER — CLINDAMYCIN HYDROCHLORIDE 300 MG/1
300 CAPSULE ORAL
Qty: 9 | Refills: 0 | Status: COMPLETED | COMMUNITY
Start: 2021-09-09 | End: 2022-01-28

## 2022-01-28 NOTE — ASSESSMENT
[FreeTextEntry1] : pt co diarrhea. releived somewhat with immodium she can continue immodium and assess her diet\par pt co rib low back pain. she is having a pet scan monday. pending those results if findings are negative the xrays i ordered should be done -ribs and ls spine to ro fx\par copy of allergy list given\par cont hydration and medications and titrate diet to avoid diarrhea.\par dr mayer will address if port needs to be changed.

## 2022-01-28 NOTE — HISTORY OF PRESENT ILLNESS
[Family Member] : family member [FreeTextEntry1] : patient presents for 4 month follow up\par  [de-identified] : 89 up   here for follow up \par she had diarrhea every other night she stopped all her medicine last night and the diarrhea stopped.  she took immodium\par \par she has a port-  they could not get blood out of it . they flushed it.  sometimes it happens.\par \par when she gets up out of bed and she gets up to walk her lower back hurts really bad. she gets lightheaded. she is in bed a lot. she is not doing much \par \par she is getting PET test monday\par Dr Alas  said she is remission. \par She was on revlimid but it did give her chf\par she had low potassium\par her cardiology said it is not the meds that are causing the diarrhea. \par \par she was on tradgenta she is not on anything for diabetes because she does not have an endocrinology. she lost a lot of weight when she was in hospital . her blood sugar is normal. it is under 110 all the time. \par

## 2022-01-28 NOTE — REVIEW OF SYSTEMS
[Recent Change In Weight] : ~T recent weight change [Hearing Loss] : hearing loss [Diarrhea] : diarrhea [Muscle Pain] : muscle pain [Negative] : Respiratory [FreeTextEntry2] : 140

## 2022-01-28 NOTE — PHYSICAL EXAM
[Normal Voice/Communication] : normal voice/communication [Normal Outer Ear/Nose] : the outer ears and nose were normal in appearance [No JVD] : no jugular venous distention [No Respiratory Distress] : no respiratory distress  [Normal Rate] : normal rate  [Non Tender] : non-tender [de-identified] : right ribs tender

## 2022-02-08 ENCOUNTER — RX RENEWAL (OUTPATIENT)
Age: 87
End: 2022-02-08

## 2022-04-07 NOTE — HISTORY OF PRESENT ILLNESS
SUBJECTIVE:  This is a 7 year old White  female who presents today for constipation issues it started about 10 days ago so mom started her on MiraLax and she was having a bowel movement every day but mom thought she should be going more than that.  She continues on MiraLax but she continues to complain of abdominal pain several times a day.  She has no nausea or vomiting and she is eating a pretty normal amount of food.    Chief Complaint   Patient presents with   • Abdominal Pain   • Office Visit   • Constipation        PEDIATRIC HISTORY:   Birth History   • Birth     Weight: 2.665 kg (5 lb 14 oz)   • Feeding: Breast Fed     Jaundiced but not requiring phototherapy       Past Medical History:   Diagnosis Date   • Nasolacrimal duct obstruction    • Otitis media    • Small for gestational age         No past surgical history on file.     Social History     Tobacco Use   • Smoking status: Never Smoker   • Smokeless tobacco: Never Used        REVIEW OF SYSTEMS:   A 14-point Review of Systems was performed, and is negative except for abdominal pain after constipation issues    OBJECTIVE:  Vital signs:    Visit Vitals  Temp 98.4 °F (36.9 °C) (Temporal)   Wt 21.8 kg (48 lb)        GROWTH CURVE PARAMETERS: 19 %ile (Z= -0.88) based on CDC (Girls, 2-20 Years) weight-for-age data using vitals from 4/6/2022. No height on file for this encounter.     General: Alert, cooperative and well-appearing.  HEENT:  Normocephalic and atraumatic; mucous membranes are moist without oropharyngeal erythema.  There is no tonsillar hypertrophy or exudates present. Ears are normal set. Tympanic membranes are clear bilaterally without erythema or fluid present. Nasal septum is midline. She does not have any nasal congestion or discharge at this time.  Neck:  Nontender. There is no cervical lymphadenopathy.  Lungs: Easy respiratory effort. Clear to auscultation bilaterally without wheezes rales or rhonchi.  Cardiovascular:  Heart rate is regular  without murmur.  Abdomen:  Soft,  and non-distended.  Normal bowel sounds with a mom mildly full feeling abdomen and mild diffuse tenderness but no rebound tenderness and no masses.  Extremities:  Warm and well-perfused. There are no rashes present.      ASSESSMENT:  1. Generalized abdominal pain           PLAN:  Orders Placed This Encounter   • XR Abdomen 1 vw KUB Supine      KUB shows mostly gas in her small intestine and large intestine with a small amount of stool in her left lower quadrant.  The MiraLax did work for her so now we will work on relieving some of the gas pain.  She can have simethicone every 2-3 hours and also use heat on her abdomen.    Return if symptoms worsen or fail to improve.   [FreeTextEntry1] : Patient presents for Medicare Wellness Exam\par  [de-identified] : 86 yo wf here for medicare wellness\par \par I am off chemo and my body got back int o my skin. I am 87 now and I am a fast walker and I like to do things fasat and I get a little dizzy so it could be my age. "I don’t know..."\par \par I had the biopsy on my back and it was a terrible experience. I had so much pain. I had pain 200/10. I don’t know what happened. 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   \par \par when i sleep on my right side i get a pain on my left side\par \par my right arm is always used for chemo, blood draws... the last chemo they had blown the vein my arm is swollen and red

## 2022-04-08 ENCOUNTER — APPOINTMENT (OUTPATIENT)
Dept: FAMILY MEDICINE | Facility: CLINIC | Age: 87
End: 2022-04-08
Payer: MEDICARE

## 2022-04-08 VITALS
HEIGHT: 64 IN | SYSTOLIC BLOOD PRESSURE: 118 MMHG | HEART RATE: 69 BPM | OXYGEN SATURATION: 98 % | DIASTOLIC BLOOD PRESSURE: 70 MMHG | TEMPERATURE: 97.7 F | RESPIRATION RATE: 14 BRPM | BODY MASS INDEX: 23.9 KG/M2 | WEIGHT: 140 LBS

## 2022-04-08 DIAGNOSIS — M54.50 LOW BACK PAIN, UNSPECIFIED: ICD-10-CM

## 2022-04-08 PROCEDURE — 99214 OFFICE O/P EST MOD 30 MIN: CPT

## 2022-04-08 NOTE — PLAN
[FreeTextEntry1] : Will order us dopplers of LE\par \par requesting records from hematologist- latest imaging showed changes to the spinal column\par \par Will call with results\par possible arthritis or spinal origin of pain

## 2022-04-08 NOTE — HISTORY OF PRESENT ILLNESS
[FreeTextEntry8] : Patient presents with left sided leg pain from knee down and groin pain that has been going on for about a week. Wants to do an ultrasound because she has had blood clots in the past\par \par Presenting in office with complaints of left leg pain/sharp left groin pain that extends from groin to lower extremity, associated with numbness down from the left knee. She describes the pain as feeling like there is a rubber band wrapped around her leg. She recently had port fixed because it was not working and does have a history of DVT. She denies redness, swelling of lower Extemities. She has been taking tylenol to help with pain however she feels it does not help. The pain wakes her up in the middle of the night. She is concerned there is a blood clot in her leg.

## 2022-04-26 ENCOUNTER — APPOINTMENT (OUTPATIENT)
Dept: FAMILY MEDICINE | Facility: CLINIC | Age: 87
End: 2022-04-26
Payer: MEDICARE

## 2022-04-26 PROCEDURE — 99441: CPT | Mod: 95

## 2022-04-26 NOTE — HISTORY OF PRESENT ILLNESS
[Home] : at home, [unfilled] , at the time of the visit. [Medical Office: ([facility])___] : at the medical office located in  [Verbal consent obtained from patient] : the patient, [unfilled] [FreeTextEntry8] : Presenting via telephonic visit for pain in the left leg that is worsening. She is wondering what she can do for pain or if there is someone she should see. She is having Sharp left groin pain that extends from the groin to lower extremity associated with numbness down from the left knee. She still feels there is a rubber band wrapped around her leg.  Recent doppler negative for DVT but does reveal popliteal cyst.

## 2022-04-26 NOTE — PLAN
[FreeTextEntry1] : will add to orthopedic  service\par recommend Tylenol and heat for pain\par RTO as needed\par still waiting recent scans from hematologist, will resend request

## 2022-05-03 ENCOUNTER — APPOINTMENT (OUTPATIENT)
Dept: ORTHOPEDIC SURGERY | Facility: CLINIC | Age: 87
End: 2022-05-03

## 2022-05-16 ENCOUNTER — APPOINTMENT (OUTPATIENT)
Dept: ORTHOPEDIC SURGERY | Facility: CLINIC | Age: 87
End: 2022-05-16
Payer: MEDICARE

## 2022-05-16 VITALS — BODY MASS INDEX: 23.9 KG/M2 | HEIGHT: 64 IN | WEIGHT: 140 LBS

## 2022-05-16 DIAGNOSIS — M51.36 OTHER INTERVERTEBRAL DISC DEGENERATION, LUMBAR REGION: ICD-10-CM

## 2022-05-16 DIAGNOSIS — M54.16 RADICULOPATHY, LUMBAR REGION: ICD-10-CM

## 2022-05-16 PROCEDURE — 72100 X-RAY EXAM L-S SPINE 2/3 VWS: CPT

## 2022-05-16 PROCEDURE — 73564 X-RAY EXAM KNEE 4 OR MORE: CPT | Mod: LT

## 2022-05-16 PROCEDURE — 99204 OFFICE O/P NEW MOD 45 MIN: CPT

## 2022-05-16 NOTE — IMAGING
[Facet arthropathy] : Facet arthropathy [Disc space narrowing] : Disc space narrowing [AP] : anteroposterior [Mild arthritis (Tonnis Grade 1)] : Mild arthritis (Tonnis Grade 1) [Left] : left knee [All Views] : anteroposterior, lateral, skyline, and anteroposterior standing [Degenerative change] : Degenerative change

## 2022-05-17 NOTE — PHYSICAL EXAM
[Left] : left knee [] : no atrophy [TWNoteComboBox7] : flexion 130 degrees [de-identified] : extension 0 degrees

## 2022-05-17 NOTE — DATA REVIEWED
[Ultrasound] : ultrasound [Left] : left [Knee] : knee [Report was reviewed and noted in the chart] : The report was reviewed and noted in the chart [FreeTextEntry1] : [facility] 4/12/22 no DVT.  small bakers cyst 2.5x2.7x0.4

## 2022-05-17 NOTE — DISCUSSION/SUMMARY
[de-identified] : General Dx Discussion\par The patient was advised of the diagnosis. The natural history of the pathology was explained in full to the patient in layman's terms. All questions were answered. The risks and benefits of surgical and non-surgical treatment alternatives were explained in full to the patient.\par \par Would recommend a course of physical therapy.\par She is allergic to corticosteriods therefore cannot give MDP or CSI. \par \par Also discussed visco injections left knee if symptoms persist.

## 2022-05-17 NOTE — HISTORY OF PRESENT ILLNESS
[de-identified] : 5/16/22: 89 y/o F here for further evaluation of left knee and left leg pain. She c/o pain for the past month. No injury or trauma. Pain radiates down her leg into her foot. She also c/o numbness and tingling. She saw her PCP who sent her to [facility] for an u/s. She states the u/s did not show any blood clots, but did show a bakers cyst.  She also has a h/o back issues and had a laminectomy in 1982. She has been taking tylenol and she does feel a little better.

## 2022-09-19 ENCOUNTER — APPOINTMENT (OUTPATIENT)
Dept: ORTHOPEDIC SURGERY | Facility: CLINIC | Age: 87
End: 2022-09-19

## 2022-09-24 ENCOUNTER — RX RENEWAL (OUTPATIENT)
Age: 87
End: 2022-09-24

## 2022-10-10 ENCOUNTER — APPOINTMENT (OUTPATIENT)
Dept: ORTHOPEDIC SURGERY | Facility: CLINIC | Age: 87
End: 2022-10-10

## 2022-12-21 ENCOUNTER — NON-APPOINTMENT (OUTPATIENT)
Age: 87
End: 2022-12-21

## 2022-12-23 ENCOUNTER — RX RENEWAL (OUTPATIENT)
Age: 87
End: 2022-12-23

## 2022-12-23 RX ORDER — BLOOD SUGAR DIAGNOSTIC
STRIP MISCELLANEOUS
Qty: 1 | Refills: 6 | Status: ACTIVE | COMMUNITY
Start: 2019-07-08 | End: 1900-01-01

## 2022-12-24 ENCOUNTER — TRANSCRIPTION ENCOUNTER (OUTPATIENT)
Age: 87
End: 2022-12-24

## 2023-01-04 ENCOUNTER — NON-APPOINTMENT (OUTPATIENT)
Age: 88
End: 2023-01-04

## 2023-01-17 ENCOUNTER — APPOINTMENT (OUTPATIENT)
Dept: FAMILY MEDICINE | Facility: CLINIC | Age: 88
End: 2023-01-17
Payer: MEDICARE

## 2023-01-17 VITALS
SYSTOLIC BLOOD PRESSURE: 110 MMHG | BODY MASS INDEX: 27.31 KG/M2 | OXYGEN SATURATION: 96 % | RESPIRATION RATE: 14 BRPM | HEART RATE: 76 BPM | HEIGHT: 64 IN | DIASTOLIC BLOOD PRESSURE: 78 MMHG | WEIGHT: 160 LBS

## 2023-01-17 VITALS — TEMPERATURE: 97.6 F

## 2023-01-17 DIAGNOSIS — N39.0 URINARY TRACT INFECTION, SITE NOT SPECIFIED: ICD-10-CM

## 2023-01-17 LAB
BILIRUB UR QL STRIP: NEGATIVE
GLUCOSE UR-MCNC: NEGATIVE
HBA1C MFR BLD HPLC: 6.2
HCG UR QL: 0.2 EU/DL
HGB UR QL STRIP.AUTO: NEGATIVE
KETONES UR-MCNC: NEGATIVE
LEUKOCYTE ESTERASE UR QL STRIP: NORMAL
NITRITE UR QL STRIP: POSITIVE
PH UR STRIP: 5.5
PROT UR STRIP-MCNC: 300
SP GR UR STRIP: 1.01

## 2023-01-17 PROCEDURE — 99215 OFFICE O/P EST HI 40 MIN: CPT | Mod: 25

## 2023-01-17 PROCEDURE — 83036 HEMOGLOBIN GLYCOSYLATED A1C: CPT | Mod: QW

## 2023-01-17 PROCEDURE — 81003 URINALYSIS AUTO W/O SCOPE: CPT | Mod: QW

## 2023-01-17 NOTE — PLAN
[FreeTextEntry1] : 89 yr old female HFU \par acute delirium ( discussed with family) \par will check urine due to above \par + uti will start on Macrobid \par \par will have patient RTO in 1 month for follow up \par encouraged intake of Ensure \par continue all medications as directed \par POCT A1c today was 6.2 (advised to hold ISS started at rehab) \par \par I reviewed prior [place] notes, imaging and labs  \par RTO as routine for follow up.\par

## 2023-01-17 NOTE — HISTORY OF PRESENT ILLNESS
[Post-hospitalization from ___ Hospital] : Post-hospitalization from [unfilled] Hospital [Discharged on ___] : discharged on [unfilled] [Admitted on: ___] : The patient was admitted on [unfilled] [FreeTextEntry2] : 89 yr old female is here for follow up since discharged from [facility] 1/3/23. She was admitted to [place] due to diarrhea and diagnosed with COVID PNA. \par \par Today she is with her sons. Per sons she has declined, she is tearful and not eating. She states her tongue burns when she eats.  Denies fever, chills

## 2023-01-17 NOTE — PHYSICAL EXAM
[Normal Sclera/Conjunctiva] : normal sclera/conjunctiva [No JVD] : no jugular venous distention [No Lymphadenopathy] : no lymphadenopathy [Supple] : supple [Thyroid Normal, No Nodules] : the thyroid was normal and there were no nodules present [No Respiratory Distress] : no respiratory distress  [No Accessory Muscle Use] : no accessory muscle use [Clear to Auscultation] : lungs were clear to auscultation bilaterally [Normal Rate] : normal rate  [Regular Rhythm] : with a regular rhythm [Normal S1, S2] : normal S1 and S2 [No Extremity Clubbing/Cyanosis] : no extremity clubbing/cyanosis [de-identified] : tearful  [de-identified] : 1/6 systolic murmur  [de-identified] : in wheelchair

## 2023-01-19 ENCOUNTER — NON-APPOINTMENT (OUTPATIENT)
Age: 88
End: 2023-01-19

## 2023-01-20 LAB — BACTERIA UR CULT: ABNORMAL

## 2023-01-27 ENCOUNTER — NON-APPOINTMENT (OUTPATIENT)
Age: 88
End: 2023-01-27

## 2023-02-07 ENCOUNTER — NON-APPOINTMENT (OUTPATIENT)
Age: 88
End: 2023-02-07

## 2023-02-07 ENCOUNTER — TRANSCRIPTION ENCOUNTER (OUTPATIENT)
Age: 88
End: 2023-02-07

## 2023-02-13 ENCOUNTER — NON-APPOINTMENT (OUTPATIENT)
Age: 88
End: 2023-02-13

## 2023-02-16 ENCOUNTER — APPOINTMENT (OUTPATIENT)
Dept: FAMILY MEDICINE | Facility: CLINIC | Age: 88
End: 2023-02-16
Payer: MEDICARE

## 2023-02-16 VITALS
RESPIRATION RATE: 14 BRPM | SYSTOLIC BLOOD PRESSURE: 140 MMHG | HEIGHT: 64 IN | BODY MASS INDEX: 25.61 KG/M2 | HEART RATE: 82 BPM | OXYGEN SATURATION: 97 % | DIASTOLIC BLOOD PRESSURE: 80 MMHG | WEIGHT: 150 LBS

## 2023-02-16 DIAGNOSIS — D64.9 ANEMIA, UNSPECIFIED: ICD-10-CM

## 2023-02-16 PROCEDURE — 99214 OFFICE O/P EST MOD 30 MIN: CPT | Mod: 25

## 2023-02-16 PROCEDURE — 36415 COLL VENOUS BLD VENIPUNCTURE: CPT

## 2023-02-16 NOTE — HISTORY OF PRESENT ILLNESS
[FreeTextEntry1] : patient presents for med follow up  [de-identified] : 89 yr old female is here for follow up with son. She is well and no longer has confusion. Son wants to check her iron levels due to taking iron daily from rehab.  Denies changes in vision, dizziness, chest pain, palpitations and lower extremity edema.\par

## 2023-02-16 NOTE — REVIEW OF SYSTEMS
[Fatigue] : fatigue [Lower Ext Edema] : lower extremity edema [Negative] : Heme/Lymph [Fever] : no fever [Chills] : no chills [Hot Flashes] : no hot flashes [Chest Pain] : no chest pain [Palpitations] : no palpitations [Leg Claudication] : no leg claudication [Orthopnea] : no orthopnea

## 2023-02-16 NOTE — PLAN
[FreeTextEntry1] : continue all medications as directed \par healthy diet and physical activity as tolerated\par routine labs due to iron taking per son \par RTO as routine for follow up.\par

## 2023-02-16 NOTE — PHYSICAL EXAM
[Normal Sclera/Conjunctiva] : normal sclera/conjunctiva [No JVD] : no jugular venous distention [No Lymphadenopathy] : no lymphadenopathy [Supple] : supple [Thyroid Normal, No Nodules] : the thyroid was normal and there were no nodules present [No Respiratory Distress] : no respiratory distress  [No Accessory Muscle Use] : no accessory muscle use [Clear to Auscultation] : lungs were clear to auscultation bilaterally [Normal Rate] : normal rate  [Regular Rhythm] : with a regular rhythm [Normal S1, S2] : normal S1 and S2 [No Extremity Clubbing/Cyanosis] : no extremity clubbing/cyanosis [de-identified] : tearful  [de-identified] : 1/6 systolic murmur  [de-identified] : in wheelchair

## 2023-02-17 ENCOUNTER — APPOINTMENT (OUTPATIENT)
Dept: FAMILY MEDICINE | Facility: CLINIC | Age: 88
End: 2023-02-17

## 2023-02-18 LAB
ALBUMIN SERPL ELPH-MCNC: 3.2 G/DL
ALP BLD-CCNC: 117 U/L
ALT SERPL-CCNC: 6 U/L
ANION GAP SERPL CALC-SCNC: 14 MMOL/L
AST SERPL-CCNC: 12 U/L
BASOPHILS # BLD AUTO: 0.04 K/UL
BASOPHILS NFR BLD AUTO: 0.5 %
BILIRUB SERPL-MCNC: 0.4 MG/DL
BUN SERPL-MCNC: 10 MG/DL
CALCIUM SERPL-MCNC: 9 MG/DL
CHLORIDE SERPL-SCNC: 105 MMOL/L
CO2 SERPL-SCNC: 19 MMOL/L
CREAT SERPL-MCNC: 0.78 MG/DL
EGFR: 72 ML/MIN/1.73M2
EOSINOPHIL # BLD AUTO: 0.19 K/UL
EOSINOPHIL NFR BLD AUTO: 2.5 %
FERRITIN SERPL-MCNC: 415 NG/ML
GLUCOSE SERPL-MCNC: 123 MG/DL
HCT VFR BLD CALC: 26.5 %
HGB BLD-MCNC: 8.8 G/DL
IMM GRANULOCYTES NFR BLD AUTO: 0.7 %
IRON SATN MFR SERPL: 12 %
IRON SERPL-MCNC: 20 UG/DL
LYMPHOCYTES # BLD AUTO: 0.87 K/UL
LYMPHOCYTES NFR BLD AUTO: 11.6 %
MAN DIFF?: NORMAL
MCHC RBC-ENTMCNC: 25.6 PG
MCHC RBC-ENTMCNC: 33.2 GM/DL
MCV RBC AUTO: 77 FL
MONOCYTES # BLD AUTO: 0.62 K/UL
MONOCYTES NFR BLD AUTO: 8.2 %
NEUTROPHILS # BLD AUTO: 5.76 K/UL
NEUTROPHILS NFR BLD AUTO: 76.5 %
PLATELET # BLD AUTO: 291 K/UL
POTASSIUM SERPL-SCNC: 3.7 MMOL/L
PROT SERPL-MCNC: 5.7 G/DL
RBC # BLD: 3.44 M/UL
RBC # BLD: 3.44 M/UL
RBC # FLD: 16.2 %
RETICS # AUTO: 1.7 %
RETICS AGGREG/RBC NFR: 58.1 K/UL
SODIUM SERPL-SCNC: 138 MMOL/L
TIBC SERPL-MCNC: 170 UG/DL
TRANSFERRIN SERPL-MCNC: 131 MG/DL
UIBC SERPL-MCNC: 149 UG/DL
WBC # FLD AUTO: 7.53 K/UL

## 2023-03-09 ENCOUNTER — APPOINTMENT (OUTPATIENT)
Dept: FAMILY MEDICINE | Facility: CLINIC | Age: 88
End: 2023-03-09
Payer: MEDICARE

## 2023-03-09 VITALS
RESPIRATION RATE: 14 BRPM | SYSTOLIC BLOOD PRESSURE: 130 MMHG | BODY MASS INDEX: 25.61 KG/M2 | WEIGHT: 150 LBS | OXYGEN SATURATION: 97 % | HEART RATE: 71 BPM | HEIGHT: 64 IN | DIASTOLIC BLOOD PRESSURE: 70 MMHG

## 2023-03-09 VITALS — TEMPERATURE: 97.3 F

## 2023-03-09 DIAGNOSIS — R07.81 PLEURODYNIA: ICD-10-CM

## 2023-03-09 DIAGNOSIS — R20.0 ANESTHESIA OF SKIN: ICD-10-CM

## 2023-03-09 DIAGNOSIS — Z87.898 PERSONAL HISTORY OF OTHER SPECIFIED CONDITIONS: ICD-10-CM

## 2023-03-09 DIAGNOSIS — M79.606 PAIN IN LEG, UNSPECIFIED: ICD-10-CM

## 2023-03-09 DIAGNOSIS — R63.0 ANOREXIA: ICD-10-CM

## 2023-03-09 DIAGNOSIS — Z86.39 PERSONAL HISTORY OF OTHER ENDOCRINE, NUTRITIONAL AND METABOLIC DISEASE: ICD-10-CM

## 2023-03-09 DIAGNOSIS — H91.90 UNSPECIFIED HEARING LOSS, UNSPECIFIED EAR: ICD-10-CM

## 2023-03-09 PROCEDURE — 99214 OFFICE O/P EST MOD 30 MIN: CPT

## 2023-03-09 RX ORDER — NITROFURANTOIN (MONOHYDRATE/MACROCRYSTALS) 25; 75 MG/1; MG/1
100 CAPSULE ORAL
Qty: 10 | Refills: 0 | Status: COMPLETED | COMMUNITY
Start: 2023-01-17 | End: 2023-03-09

## 2023-03-09 RX ORDER — POTASSIUM CHLORIDE 1500 MG/1
20 TABLET, EXTENDED RELEASE ORAL DAILY
Qty: 60 | Refills: 3 | Status: COMPLETED | COMMUNITY
End: 2023-03-09

## 2023-03-09 RX ORDER — ATORVASTATIN CALCIUM 40 MG/1
40 TABLET, FILM COATED ORAL
Qty: 90 | Refills: 1 | Status: DISCONTINUED | COMMUNITY
Start: 2021-09-13 | End: 2023-03-09

## 2023-03-09 NOTE — HISTORY OF PRESENT ILLNESS
[FreeTextEntry1] : pt presents for fall follow up  [de-identified] : tripped going to the bathroom she fell and hit her face  on the walker  bruises under her  left eye and she had a lot of swelling  . This happened  3 1/2 weeks ago and she iced it and the swelling went down. \par \par she reports no sequelae- eyes face teeth. she is walking w walker. she no longer get physical therapy \par \par she has a cough- she is seeing cardio- the recorder noted an arrhythmia\par she feels weak\par she needs to exercise and move more. she is on the cough watching tv.  \par no one visits. \par \par

## 2023-03-09 NOTE — PHYSICAL EXAM
[Regular Rhythm] : with a regular rhythm [Normal S1, S2] : normal S1 and S2 [Normal] : normal rate, regular rhythm, normal S1 and S2 and no murmur heard [de-identified] : very [place] [de-identified] : sinus arrythmia [de-identified] : able to walk without walker. needs help standing up

## 2023-03-09 NOTE — REVIEW OF SYSTEMS
[Fatigue] : fatigue [Recent Change In Weight] : ~T recent weight change [Cough] : cough [Unsteady Walking] : ataxia [FreeTextEntry4] : fell on left eye.

## 2023-03-09 NOTE — ASSESSMENT
[FreeTextEntry1] : 89 yo wf he NHL, sp fall 3  1/3 weeks ago in home she hit her left eye area on walker. she has pain on exam. check orbital xray\par she has an arrhythmia. follow up with cardio\par she has a cough. consider telling [place] [facility] they would be doing surveillance scans for Her cancer.\par We spent sufficient time to discuss aspects of care; questions were answered  to patient's satisfaction.The diagnosis and care plan were discussed with patient in detail.  Patient test results were  reviewed and explained in full. All questions and concerns  were answered to the best of my knowledge.\par

## 2023-03-10 LAB
RAPID RVP RESULT: NOT DETECTED
SARS-COV-2 RNA PNL RESP NAA+PROBE: NOT DETECTED

## 2023-03-20 ENCOUNTER — OUTPATIENT (OUTPATIENT)
Dept: OUTPATIENT SERVICES | Facility: HOSPITAL | Age: 88
LOS: 1 days | End: 2023-03-20
Payer: MEDICARE

## 2023-03-20 ENCOUNTER — APPOINTMENT (OUTPATIENT)
Dept: RADIOLOGY | Facility: CLINIC | Age: 88
End: 2023-03-20
Payer: MEDICARE

## 2023-03-20 DIAGNOSIS — R05.9 COUGH, UNSPECIFIED: ICD-10-CM

## 2023-03-20 PROCEDURE — 70200 X-RAY EXAM OF EYE SOCKETS: CPT

## 2023-03-20 PROCEDURE — 70200 X-RAY EXAM OF EYE SOCKETS: CPT | Mod: 26

## 2023-05-22 ENCOUNTER — RX RENEWAL (OUTPATIENT)
Age: 88
End: 2023-05-22

## 2023-06-21 ENCOUNTER — NON-APPOINTMENT (OUTPATIENT)
Age: 88
End: 2023-06-21

## 2023-06-21 ENCOUNTER — RX RENEWAL (OUTPATIENT)
Age: 88
End: 2023-06-21

## 2023-06-21 ENCOUNTER — APPOINTMENT (OUTPATIENT)
Dept: FAMILY MEDICINE | Facility: CLINIC | Age: 88
End: 2023-06-21
Payer: MEDICARE

## 2023-06-21 VITALS
BODY MASS INDEX: 21.34 KG/M2 | RESPIRATION RATE: 14 BRPM | TEMPERATURE: 98.3 F | SYSTOLIC BLOOD PRESSURE: 140 MMHG | WEIGHT: 125 LBS | OXYGEN SATURATION: 98 % | HEIGHT: 64 IN | DIASTOLIC BLOOD PRESSURE: 80 MMHG | HEART RATE: 66 BPM

## 2023-06-21 DIAGNOSIS — M17.12 UNILATERAL PRIMARY OSTEOARTHRITIS, LEFT KNEE: ICD-10-CM

## 2023-06-21 PROCEDURE — 93000 ELECTROCARDIOGRAM COMPLETE: CPT

## 2023-06-21 PROCEDURE — 99214 OFFICE O/P EST MOD 30 MIN: CPT | Mod: 25

## 2023-06-21 RX ORDER — AZITHROMYCIN 250 MG/1
250 TABLET, FILM COATED ORAL
Qty: 1 | Refills: 0 | Status: COMPLETED | COMMUNITY
Start: 2023-03-10 | End: 2023-06-21

## 2023-06-21 NOTE — HEALTH RISK ASSESSMENT
[No] : In the past 12 months have you used drugs other than those required for medical reasons? No [Any fall with injury in past year] : Patient reported fall with injury in the past year [Audit-CScore] : 0 [de-identified] : nono

## 2023-06-21 NOTE — ASSESSMENT
[FreeTextEntry1] : 89 yo wf co chest pressure. EKG performed on this day in the office  to evaluate for any ischemia, new arrhythmia, and for any significant changes and reviewed by PAYAM Rosa. It is sinus, ectopic vent beat,  Poss LVH. follow up cardiology\par co dysuria but cant give sample.\par \par Labs to be drawn/ specimens obtained  at outside  lab    for evaluation of   assessed conditions - cbc cmp lipid    hgba1c ua c+s.  for physical and screening purposes.\par she will get urine at lab and blood at [facility]  she has a port\par reviewed importance of medication complaince.\par We spent sufficient time to discuss aspects of care; questions were answered  to patient's satisfaction.The diagnosis and care plan were discussed with patient in detail.  Patient test results were  reviewed and explained in full. All questions and concerns  were answered to the best of my knowledge.\par

## 2023-06-21 NOTE — PHYSICAL EXAM
[Ill-Appearing] : ill-appearing [Normal] : no respiratory distress, lungs were clear to auscultation bilaterally and no accessory muscle use [Tachycardia] : tachycardic [Irregularly Irregular] : irregularly irregular [Normal S1] : normal S1 [Normal S2] : normal S2

## 2023-06-21 NOTE — HISTORY OF PRESENT ILLNESS
[Family Member] : family member [FreeTextEntry8] : patient c/o +fatigue,+ irregular heart rate, +dizzy X 2 weeks \par \par co chest pressure a weight in the center of her chest. when she walks to the bathroom and back she is out of breath and it takes a while to recover. \par she is not complaint w her medicine - she is erratic.  she sometimes falls asleep .\par she had just taken a pill ( blue and bitter)  and then 5 m later she felt the irregularity. she checked her oxygen pulse ox and the heart rate was 138  and it went to 40 then 70 . \par she saw cardio 2 m ago and they put her on a blood thinner but she didn’t take it. \par \par she has urine symptoms for 1 week. evening burning.  she is afraid  it is coming

## 2023-06-21 NOTE — REVIEW OF SYSTEMS
[Fatigue] : fatigue [Palpitations] : palpitations [Shortness Of Breath] : shortness of breath [Dysuria] : dysuria [FreeTextEntry5] : chest pressure

## 2023-07-07 ENCOUNTER — RX RENEWAL (OUTPATIENT)
Age: 88
End: 2023-07-07

## 2023-08-20 ENCOUNTER — RX RENEWAL (OUTPATIENT)
Age: 88
End: 2023-08-20

## 2023-11-02 ENCOUNTER — NON-APPOINTMENT (OUTPATIENT)
Age: 88
End: 2023-11-02

## 2023-11-02 RX ORDER — HYDRALAZINE HYDROCHLORIDE 100 MG/1
100 TABLET ORAL EVERY 8 HOURS
Qty: 270 | Refills: 1 | Status: ACTIVE | COMMUNITY
Start: 2021-09-13 | End: 1900-01-01

## 2023-11-02 RX ORDER — FERROUS SULFATE 325(65) MG
TABLET ORAL
Refills: 0 | Status: DISCONTINUED | COMMUNITY
End: 2023-11-02

## 2023-11-07 ENCOUNTER — APPOINTMENT (OUTPATIENT)
Dept: FAMILY MEDICINE | Facility: CLINIC | Age: 88
End: 2023-11-07

## 2023-11-17 DIAGNOSIS — U07.1 COVID-19: ICD-10-CM

## 2023-11-17 RX ORDER — CIPROFLOXACIN HYDROCHLORIDE 250 MG/1
250 TABLET, FILM COATED ORAL
Qty: 20 | Refills: 0 | Status: DISCONTINUED | COMMUNITY
Start: 2023-06-26 | End: 2023-11-17

## 2023-11-27 ENCOUNTER — APPOINTMENT (OUTPATIENT)
Dept: FAMILY MEDICINE | Facility: CLINIC | Age: 88
End: 2023-11-27
Payer: MEDICARE

## 2023-11-27 VITALS
DIASTOLIC BLOOD PRESSURE: 70 MMHG | SYSTOLIC BLOOD PRESSURE: 130 MMHG | HEIGHT: 64 IN | OXYGEN SATURATION: 98 % | HEART RATE: 62 BPM | RESPIRATION RATE: 12 BRPM

## 2023-11-27 PROCEDURE — 99214 OFFICE O/P EST MOD 30 MIN: CPT

## 2023-11-29 RX ORDER — ATORVASTATIN CALCIUM 40 MG/1
40 TABLET, FILM COATED ORAL
Qty: 90 | Refills: 3 | Status: ACTIVE | COMMUNITY
Start: 2023-11-02 | End: 1900-01-01

## 2023-11-29 RX ORDER — UBIDECARENONE/VIT E ACET 100MG-5
25 MCG CAPSULE ORAL
Qty: 90 | Refills: 1 | Status: ACTIVE | COMMUNITY
Start: 2023-11-02 | End: 1900-01-01

## 2023-12-04 RX ORDER — LOSARTAN POTASSIUM 100 MG/1
100 TABLET, FILM COATED ORAL
Qty: 90 | Refills: 3 | Status: ACTIVE | COMMUNITY
Start: 2021-09-13 | End: 1900-01-01

## 2023-12-15 RX ORDER — NADOLOL 80 MG/1
80 TABLET ORAL
Qty: 180 | Refills: 3 | Status: ACTIVE | COMMUNITY
Start: 2021-09-13 | End: 1900-01-01

## 2024-01-22 ENCOUNTER — APPOINTMENT (OUTPATIENT)
Dept: FAMILY MEDICINE | Facility: CLINIC | Age: 89
End: 2024-01-22
Payer: MEDICARE

## 2024-01-22 ENCOUNTER — NON-APPOINTMENT (OUTPATIENT)
Age: 89
End: 2024-01-22

## 2024-01-22 VITALS
OXYGEN SATURATION: 98 % | HEART RATE: 69 BPM | WEIGHT: 135 LBS | TEMPERATURE: 98.1 F | SYSTOLIC BLOOD PRESSURE: 138 MMHG | BODY MASS INDEX: 23.05 KG/M2 | DIASTOLIC BLOOD PRESSURE: 72 MMHG | HEIGHT: 64 IN | RESPIRATION RATE: 12 BRPM

## 2024-01-22 DIAGNOSIS — Z87.898 PERSONAL HISTORY OF OTHER SPECIFIED CONDITIONS: ICD-10-CM

## 2024-01-22 DIAGNOSIS — Z86.79 PERSONAL HISTORY OF OTHER DISEASES OF THE CIRCULATORY SYSTEM: ICD-10-CM

## 2024-01-22 DIAGNOSIS — Z00.00 ENCOUNTER FOR GENERAL ADULT MEDICAL EXAMINATION W/OUT ABNORMAL FINDINGS: ICD-10-CM

## 2024-01-22 DIAGNOSIS — Z86.711 PERSONAL HISTORY OF PULMONARY EMBOLISM: ICD-10-CM

## 2024-01-22 DIAGNOSIS — C85.90 NON-HODGKIN LYMPHOMA, UNSPECIFIED, UNSPECIFIED SITE: ICD-10-CM

## 2024-01-22 DIAGNOSIS — I49.01 VENTRICULAR FIBRILLATION: ICD-10-CM

## 2024-01-22 DIAGNOSIS — C50.919 MALIGNANT NEOPLASM OF UNSPECIFIED SITE OF UNSPECIFIED FEMALE BREAST: ICD-10-CM

## 2024-01-22 DIAGNOSIS — K80.20 CALCULUS OF GALLBLADDER W/OUT CHOLECYSTITIS W/OUT OBSTRUCTION: ICD-10-CM

## 2024-01-22 DIAGNOSIS — C85.10 UNSPECIFIED B-CELL LYMPHOMA, UNSPECIFIED SITE: ICD-10-CM

## 2024-01-22 DIAGNOSIS — R07.89 OTHER CHEST PAIN: ICD-10-CM

## 2024-01-22 DIAGNOSIS — R41.0 DISORIENTATION, UNSPECIFIED: ICD-10-CM

## 2024-01-22 DIAGNOSIS — M71.22 SYNOVIAL CYST OF POPLITEAL SPACE [BAKER], LEFT KNEE: ICD-10-CM

## 2024-01-22 PROCEDURE — G2211 COMPLEX E/M VISIT ADD ON: CPT

## 2024-01-22 PROCEDURE — 99213 OFFICE O/P EST LOW 20 MIN: CPT

## 2024-01-22 PROCEDURE — 93000 ELECTROCARDIOGRAM COMPLETE: CPT

## 2024-01-22 RX ORDER — LEVETIRACETAM 1000 MG/1
1000 TABLET, FILM COATED ORAL
Qty: 180 | Refills: 0 | Status: DISCONTINUED | COMMUNITY
Start: 2023-11-02 | End: 2024-01-22

## 2024-01-22 RX ORDER — FENOFIBRATE 134 MG/1
134 CAPSULE ORAL
Qty: 90 | Refills: 0 | Status: DISCONTINUED | COMMUNITY
Start: 2018-02-19 | End: 2024-01-22

## 2024-01-23 NOTE — HISTORY OF PRESENT ILLNESS
[No Pertinent Cardiac History] : no history of aortic stenosis, atrial fibrillation, coronary artery disease, recent myocardial infarction, or implantable device/pacemaker [No Pertinent Pulmonary History] : no history of asthma, COPD, sleep apnea, or smoking [No Adverse Anesthesia Reaction] : no adverse anesthesia reaction in self or family member [(Patient denies any chest pain, claudication, dyspnea on exertion, orthopnea, palpitations or syncope)] : Patient denies any chest pain, claudication, dyspnea on exertion, orthopnea, palpitations or syncope [Chronic Anticoagulation] : no chronic anticoagulation [Chronic Kidney Disease] : no chronic kidney disease [Diabetes] : no diabetes [FreeTextEntry1] : right chest wall port procedure  [FreeTextEntry2] : 01/25/2024 [FreeTextEntry3] : Dr. Shankar at [facility] [FreeTextEntry4] : 89F PMHX breast cancer, osseous sclerosis, NHL, HTN and hypothyrodism. Denies chest pain, palpitations.

## 2024-01-23 NOTE — PHYSICAL EXAM
[Normal Sclera/Conjunctiva] : normal sclera/conjunctiva [No JVD] : no jugular venous distention [No Lymphadenopathy] : no lymphadenopathy [Supple] : supple [Thyroid Normal, No Nodules] : the thyroid was normal and there were no nodules present [No Respiratory Distress] : no respiratory distress  [No Accessory Muscle Use] : no accessory muscle use [Clear to Auscultation] : lungs were clear to auscultation bilaterally [Normal Rate] : normal rate  [Regular Rhythm] : with a regular rhythm [Normal S1, S2] : normal S1 and S2 [Pedal Pulses Present] : the pedal pulses are present [No Extremity Clubbing/Cyanosis] : no extremity clubbing/cyanosis [Normal Affect] : the affect was normal [Alert and Oriented x3] : oriented to person, place, and time [de-identified] : thin  [de-identified] : 1/6 systolic murmur  [de-identified] : in wheelchair

## 2024-01-23 NOTE — ASSESSMENT
[Patient Optimized for Surgery] : Patient optimized for surgery [No Further Testing Recommended] : no further testing recommended [Modify medications prior to procedure] : Modify medications prior to procedure [As per surgery] : as per surgery [FreeTextEntry4] : Patient is low risk and medically cleared for planned procedure on 1/25/24 presurgical labs and EKG have been reviewed  reviewed prior [facility] hospitalization note from October 2023, no apparent seizure patient no longer on Keppra Denies history of Myocardial infraction, CVA and YEE  May continue to take prescribed medications as directed.  NPO after midnight prior to surgery  may take morning medications with sips of water morning of procedure  Advised not to take ASA and or NSAIDs prior  to surgeryno apparent seizures noted from prior [facility].

## 2024-03-27 ENCOUNTER — APPOINTMENT (OUTPATIENT)
Dept: FAMILY MEDICINE | Facility: CLINIC | Age: 89
End: 2024-03-27
Payer: MEDICARE

## 2024-03-27 VITALS
HEART RATE: 83 BPM | BODY MASS INDEX: 23.22 KG/M2 | OXYGEN SATURATION: 98 % | WEIGHT: 136 LBS | HEIGHT: 64 IN | RESPIRATION RATE: 14 BRPM | DIASTOLIC BLOOD PRESSURE: 70 MMHG | SYSTOLIC BLOOD PRESSURE: 130 MMHG

## 2024-03-27 DIAGNOSIS — I10 ESSENTIAL (PRIMARY) HYPERTENSION: ICD-10-CM

## 2024-03-27 DIAGNOSIS — E03.9 HYPOTHYROIDISM, UNSPECIFIED: ICD-10-CM

## 2024-03-27 DIAGNOSIS — R30.0 DYSURIA: ICD-10-CM

## 2024-03-27 DIAGNOSIS — Z01.818 ENCOUNTER FOR OTHER PREPROCEDURAL EXAMINATION: ICD-10-CM

## 2024-03-27 DIAGNOSIS — R56.9 UNSPECIFIED CONVULSIONS: ICD-10-CM

## 2024-03-27 DIAGNOSIS — R33.9 RETENTION OF URINE, UNSPECIFIED: ICD-10-CM

## 2024-03-27 DIAGNOSIS — R53.82 CHRONIC FATIGUE, UNSPECIFIED: ICD-10-CM

## 2024-03-27 LAB — GLUCOSE BLDC GLUCOMTR-MCNC: 89

## 2024-03-27 PROCEDURE — 99214 OFFICE O/P EST MOD 30 MIN: CPT

## 2024-03-27 PROCEDURE — 82962 GLUCOSE BLOOD TEST: CPT

## 2024-03-27 PROCEDURE — G2211 COMPLEX E/M VISIT ADD ON: CPT

## 2024-03-27 NOTE — HISTORY OF PRESENT ILLNESS
[Family Member] : family member [de-identified] : 89 wf hx of breast cancer B cell lymphoma, htn, niddm, hypothryoid, osteoarthritis, [place]  co  fatigue,  urine co's she has trouble urinating she normally has incontinence. Her depends has been dry for 2 d. she does not urinate in the am when she wakes up she has no sensation to urinate. she has no pain with urination no blood with urination no back pain she is not on any anticholinergic meds, antihistamine, muscle relaxors or new medications She is seeing Dr Merritt and her PET scan is due in May.  shehad her port flushed . she has a pacemaker. she generally is home watching tv all day. she is drinking water.   [FreeTextEntry1] : pt presents for med follow up  pt c/o trouble urinating x 2 days  pt c/o fatigue

## 2024-03-27 NOTE — ASSESSMENT
[FreeTextEntry1] :  Breast ca Lymphoma  follow up Dr Merritt  Urinary retention check ua c&s check bladder and renal sono check kidney and bladder xray  if retention then go to ER. she will need catheterization  fatigue glucose normal 84 likely her fatigue is not related to diabetes  Labs to be drawn/ specimens obtained  at outside  lab    for evaluation of   assessed conditions - cbc cmp lipid    hgba1c for physical and screening purposes.

## 2024-03-27 NOTE — REVIEW OF SYSTEMS
[Fever] : no fever [Chills] : no chills [Fatigue] : no fatigue [Recent Change In Weight] : ~T no recent weight change [Abdominal Pain] : abdominal pain [Dysuria] : no dysuria [Incontinence] : no incontinence [Nocturia] : no nocturia [Hematuria] : no hematuria [Frequency] : no frequency [Vaginal Discharge] : no vaginal discharge [Negative] : Heme/Lymph [FreeTextEntry8] : retention

## 2024-03-27 NOTE — PHYSICAL EXAM
[Normal] : no respiratory distress, lungs were clear to auscultation bilaterally and no accessory muscle use [Normal Rate] : normal rate  [Regular Rhythm] : with a regular rhythm [Normal S1, S2] : normal S1 and S2 [No Murmur] : no murmur heard [de-identified] : pelvic pressure  bladder full \  no cvat

## 2024-04-03 ENCOUNTER — APPOINTMENT (OUTPATIENT)
Dept: RADIOLOGY | Facility: CLINIC | Age: 89
End: 2024-04-03

## 2024-04-03 ENCOUNTER — APPOINTMENT (OUTPATIENT)
Dept: ULTRASOUND IMAGING | Facility: CLINIC | Age: 89
End: 2024-04-03

## 2024-05-16 ENCOUNTER — RX RENEWAL (OUTPATIENT)
Age: 89
End: 2024-05-16

## 2024-05-16 RX ORDER — LEVOTHYROXINE SODIUM 0.03 MG/1
25 TABLET ORAL
Qty: 90 | Refills: 1 | Status: ACTIVE | COMMUNITY
Start: 2020-07-27 | End: 1900-01-01

## 2024-06-16 RX ORDER — NIFEDIPINE 60 MG/1
60 TABLET, EXTENDED RELEASE ORAL
Qty: 180 | Refills: 1 | Status: ACTIVE | COMMUNITY
Start: 2021-09-13 | End: 1900-01-01

## 2024-08-16 ENCOUNTER — NON-APPOINTMENT (OUTPATIENT)
Age: 89
End: 2024-08-16

## 2024-10-28 ENCOUNTER — APPOINTMENT (OUTPATIENT)
Dept: FAMILY MEDICINE | Facility: CLINIC | Age: 89
End: 2024-10-28

## 2024-11-19 ENCOUNTER — APPOINTMENT (OUTPATIENT)
Dept: FAMILY MEDICINE | Facility: CLINIC | Age: 89
End: 2024-11-19

## 2024-11-19 ENCOUNTER — APPOINTMENT (OUTPATIENT)
Dept: FAMILY MEDICINE | Facility: CLINIC | Age: 89
End: 2024-11-19
Payer: MEDICARE

## 2024-11-19 DIAGNOSIS — R30.0 DYSURIA: ICD-10-CM

## 2024-11-19 DIAGNOSIS — R33.9 RETENTION OF URINE, UNSPECIFIED: ICD-10-CM

## 2024-11-19 DIAGNOSIS — F41.9 ANXIETY DISORDER, UNSPECIFIED: ICD-10-CM

## 2024-11-19 PROCEDURE — 99443: CPT | Mod: 93

## 2024-11-19 RX ORDER — ESCITALOPRAM OXALATE 10 MG/1
10 TABLET ORAL
Qty: 30 | Refills: 1 | Status: ACTIVE | COMMUNITY
Start: 2024-11-19 | End: 1900-01-01

## 2024-11-25 RX ORDER — LEVETIRACETAM 750 MG/1
750 TABLET, FILM COATED ORAL TWICE DAILY
Qty: 180 | Refills: 0 | Status: ACTIVE | COMMUNITY
Start: 2024-11-25 | End: 1900-01-01

## 2024-11-25 RX ORDER — VALPROIC ACID 250 MG/5ML
250 SOLUTION ORAL EVERY 8 HOURS
Qty: 1 | Refills: 2 | Status: ACTIVE | COMMUNITY
Start: 2024-11-25 | End: 1900-01-01

## 2025-01-22 NOTE — END OF VISIT
moderate
[>50% of Time Spent on Counseling and Coordination of Care for  ___] : Greater than 50% of the encounter time was spent on counseling and coordination of care for [unfilled]

## 2025-04-03 ENCOUNTER — NON-APPOINTMENT (OUTPATIENT)
Age: 89
End: 2025-04-03

## 2025-04-03 ENCOUNTER — APPOINTMENT (OUTPATIENT)
Dept: FAMILY MEDICINE | Facility: CLINIC | Age: 89
End: 2025-04-03
Payer: MEDICARE

## 2025-04-03 VITALS
WEIGHT: 115 LBS | OXYGEN SATURATION: 99 % | HEIGHT: 64 IN | SYSTOLIC BLOOD PRESSURE: 120 MMHG | DIASTOLIC BLOOD PRESSURE: 70 MMHG | HEART RATE: 111 BPM | TEMPERATURE: 97.7 F | BODY MASS INDEX: 19.63 KG/M2 | RESPIRATION RATE: 14 BRPM

## 2025-04-03 DIAGNOSIS — I48.91 UNSPECIFIED ATRIAL FIBRILLATION: ICD-10-CM

## 2025-04-03 DIAGNOSIS — N39.0 URINARY TRACT INFECTION, SITE NOT SPECIFIED: ICD-10-CM

## 2025-04-03 LAB
BILIRUB UR QL STRIP: NEGATIVE
GLUCOSE UR-MCNC: NEGATIVE
HCG UR QL: 0.2 EU/DL
HGB UR QL STRIP.AUTO: NEGATIVE
KETONES UR-MCNC: NEGATIVE
LEUKOCYTE ESTERASE UR QL STRIP: NORMAL
NITRITE UR QL STRIP: NEGATIVE
PH UR STRIP: 6
PROT UR STRIP-MCNC: 30
SP GR UR STRIP: 1.01

## 2025-04-03 PROCEDURE — 99214 OFFICE O/P EST MOD 30 MIN: CPT

## 2025-04-03 PROCEDURE — G2211 COMPLEX E/M VISIT ADD ON: CPT

## 2025-04-03 PROCEDURE — 93000 ELECTROCARDIOGRAM COMPLETE: CPT

## 2025-04-03 PROCEDURE — 81003 URINALYSIS AUTO W/O SCOPE: CPT | Mod: QW

## 2025-04-03 RX ORDER — APIXABAN 2.5 MG/1
2.5 TABLET, FILM COATED ORAL
Refills: 0 | Status: ACTIVE | COMMUNITY

## 2025-04-03 RX ORDER — NITROFURANTOIN (MONOHYDRATE/MACROCRYSTALS) 25; 75 MG/1; MG/1
100 CAPSULE ORAL
Qty: 20 | Refills: 0 | Status: ACTIVE | COMMUNITY
Start: 2025-04-03 | End: 1900-01-01

## 2025-04-05 LAB
APPEARANCE: ABNORMAL
BACTERIA: ABNORMAL /HPF
BILIRUBIN URINE: NEGATIVE
BLOOD URINE: NEGATIVE
CAST: 0 /LPF
COLOR: YELLOW
EPITHELIAL CELLS: 3 /HPF
GLUCOSE QUALITATIVE U: NEGATIVE MG/DL
KETONES URINE: NEGATIVE MG/DL
LEUKOCYTE ESTERASE URINE: ABNORMAL
MICROSCOPIC-UA: NORMAL
NITRITE URINE: NEGATIVE
PH URINE: 6.5
PROTEIN URINE: 30 MG/DL
RED BLOOD CELLS URINE: 3 /HPF
SPECIFIC GRAVITY URINE: 1.01
UROBILINOGEN URINE: 0.2 MG/DL
WHITE BLOOD CELLS URINE: 270 /HPF

## 2025-04-05 RX ORDER — CIPROFLOXACIN HYDROCHLORIDE 250 MG/1
250 TABLET, FILM COATED ORAL
Qty: 14 | Refills: 0 | Status: ACTIVE | COMMUNITY
Start: 2025-04-05 | End: 1900-01-01

## 2025-04-07 LAB — BACTERIA UR CULT: ABNORMAL

## 2025-04-07 RX ORDER — NITROFURANTOIN (MONOHYDRATE/MACROCRYSTALS) 25; 75 MG/1; MG/1
100 CAPSULE ORAL
Qty: 14 | Refills: 0 | Status: ACTIVE | COMMUNITY
Start: 2025-04-07 | End: 1900-01-01

## 2025-04-30 ENCOUNTER — APPOINTMENT (OUTPATIENT)
Dept: FAMILY MEDICINE | Facility: CLINIC | Age: 89
End: 2025-04-30
Payer: MEDICARE

## 2025-04-30 ENCOUNTER — LABORATORY RESULT (OUTPATIENT)
Age: 89
End: 2025-04-30

## 2025-04-30 VITALS
RESPIRATION RATE: 14 BRPM | OXYGEN SATURATION: 98 % | SYSTOLIC BLOOD PRESSURE: 126 MMHG | HEIGHT: 64 IN | DIASTOLIC BLOOD PRESSURE: 64 MMHG | HEART RATE: 56 BPM

## 2025-04-30 DIAGNOSIS — N39.0 URINARY TRACT INFECTION, SITE NOT SPECIFIED: ICD-10-CM

## 2025-04-30 DIAGNOSIS — B96.89 URINARY TRACT INFECTION, SITE NOT SPECIFIED: ICD-10-CM

## 2025-04-30 PROCEDURE — 99214 OFFICE O/P EST MOD 30 MIN: CPT

## 2025-04-30 PROCEDURE — G2211 COMPLEX E/M VISIT ADD ON: CPT

## 2025-05-05 LAB
APPEARANCE: ABNORMAL
BACTERIA UR CULT: ABNORMAL
BILIRUBIN URINE: NEGATIVE
BLOOD URINE: NEGATIVE
COLOR: YELLOW
GLUCOSE QUALITATIVE U: NEGATIVE MG/DL
KETONES URINE: NEGATIVE MG/DL
LEUKOCYTE ESTERASE URINE: ABNORMAL
NITRITE URINE: NEGATIVE
PH URINE: 6.5
PROTEIN URINE: 100 MG/DL
SPECIFIC GRAVITY URINE: 1.01
UROBILINOGEN URINE: 0.2 MG/DL

## 2025-06-11 ENCOUNTER — APPOINTMENT (OUTPATIENT)
Dept: FAMILY MEDICINE | Facility: CLINIC | Age: 89
End: 2025-06-11
Payer: MEDICARE

## 2025-06-11 VITALS — DIASTOLIC BLOOD PRESSURE: 98 MMHG | HEART RATE: 108 BPM | SYSTOLIC BLOOD PRESSURE: 139 MMHG

## 2025-06-11 PROBLEM — N39.0 ACUTE UTI: Status: RESOLVED | Noted: 2023-01-17 | Resolved: 2025-06-11

## 2025-06-11 PROBLEM — Z91.81 AT RISK FOR FALLS: Status: ACTIVE | Noted: 2025-06-11

## 2025-06-11 PROBLEM — R53.81 PHYSICAL DECONDITIONING: Status: ACTIVE | Noted: 2025-06-11

## 2025-06-11 PROCEDURE — G2211 COMPLEX E/M VISIT ADD ON: CPT | Mod: 2W

## 2025-06-11 PROCEDURE — 99214 OFFICE O/P EST MOD 30 MIN: CPT | Mod: 2W
